# Patient Record
Sex: FEMALE | Race: WHITE | NOT HISPANIC OR LATINO | Employment: FULL TIME | URBAN - NONMETROPOLITAN AREA
[De-identification: names, ages, dates, MRNs, and addresses within clinical notes are randomized per-mention and may not be internally consistent; named-entity substitution may affect disease eponyms.]

---

## 2020-06-17 RX ORDER — OMEPRAZOLE 40 MG/1
40 CAPSULE, DELAYED RELEASE ORAL DAILY
Qty: 30 CAPSULE | Refills: 5 | Status: CANCELLED | OUTPATIENT
Start: 2020-06-17

## 2020-07-19 RX ORDER — OMEPRAZOLE 40 MG/1
40 CAPSULE, DELAYED RELEASE ORAL DAILY
Qty: 30 CAPSULE | Refills: 5 | Status: CANCELLED | OUTPATIENT
Start: 2020-07-19

## 2020-08-02 RX ORDER — BUSPIRONE HYDROCHLORIDE 15 MG/1
7.5 TABLET ORAL 2 TIMES DAILY
Qty: 30 TABLET | Refills: 3 | Status: CANCELLED | OUTPATIENT
Start: 2020-08-02

## 2020-08-02 RX ORDER — OMEPRAZOLE 40 MG/1
40 CAPSULE, DELAYED RELEASE ORAL DAILY
Qty: 30 CAPSULE | Refills: 5 | Status: CANCELLED | OUTPATIENT
Start: 2020-08-02

## 2020-12-30 ENCOUNTER — IMMUNIZATION (OUTPATIENT)
Dept: VACCINE CLINIC | Facility: HOSPITAL | Age: 30
End: 2020-12-30

## 2020-12-30 PROCEDURE — 0001A: CPT | Performed by: FAMILY MEDICINE

## 2020-12-30 PROCEDURE — 91300 HC SARSCOV02 VAC 30MCG/0.3ML IM: CPT | Performed by: FAMILY MEDICINE

## 2021-01-20 ENCOUNTER — IMMUNIZATION (OUTPATIENT)
Dept: VACCINE CLINIC | Facility: HOSPITAL | Age: 31
End: 2021-01-20

## 2021-01-20 PROCEDURE — 91300 HC SARSCOV02 VAC 30MCG/0.3ML IM: CPT | Performed by: FAMILY MEDICINE

## 2021-01-20 PROCEDURE — 0002A: CPT | Performed by: FAMILY MEDICINE

## 2021-03-18 ENCOUNTER — LAB (OUTPATIENT)
Dept: LAB | Facility: HOSPITAL | Age: 31
End: 2021-03-18

## 2021-03-18 ENCOUNTER — TRANSCRIBE ORDERS (OUTPATIENT)
Dept: ADMINISTRATIVE | Facility: HOSPITAL | Age: 31
End: 2021-03-18

## 2021-03-18 DIAGNOSIS — E55.9 VITAMIN D DEFICIENCY: ICD-10-CM

## 2021-03-18 DIAGNOSIS — D50.9 IRON DEFICIENCY ANEMIA, UNSPECIFIED IRON DEFICIENCY ANEMIA TYPE: ICD-10-CM

## 2021-03-18 DIAGNOSIS — F41.9 ANXIETY DISORDER, UNSPECIFIED TYPE: ICD-10-CM

## 2021-03-18 DIAGNOSIS — D64.9 ANEMIA, UNSPECIFIED TYPE: ICD-10-CM

## 2021-03-18 DIAGNOSIS — E78.1 PURE HYPERGLYCERIDEMIA: ICD-10-CM

## 2021-03-18 DIAGNOSIS — D50.9 IRON DEFICIENCY ANEMIA, UNSPECIFIED IRON DEFICIENCY ANEMIA TYPE: Primary | ICD-10-CM

## 2021-03-18 LAB
25(OH)D3 SERPL-MCNC: 33 NG/ML
ALBUMIN SERPL-MCNC: 4.7 G/DL (ref 3.5–5.2)
ALBUMIN/GLOB SERPL: 1.9 G/DL
ALP SERPL-CCNC: 44 U/L (ref 39–117)
ALT SERPL W P-5'-P-CCNC: 84 U/L (ref 1–33)
ANION GAP SERPL CALCULATED.3IONS-SCNC: 13.4 MMOL/L (ref 5–15)
AST SERPL-CCNC: 58 U/L (ref 1–32)
BASOPHILS # BLD AUTO: 0.05 10*3/MM3 (ref 0–0.2)
BASOPHILS NFR BLD AUTO: 0.8 % (ref 0–1.5)
BILIRUB SERPL-MCNC: 0.4 MG/DL (ref 0–1.2)
BUN SERPL-MCNC: 10 MG/DL (ref 6–20)
BUN/CREAT SERPL: 15.9 (ref 7–25)
CALCIUM SPEC-SCNC: 8.8 MG/DL (ref 8.6–10.5)
CHLORIDE SERPL-SCNC: 101 MMOL/L (ref 98–107)
CHOLEST SERPL-MCNC: 188 MG/DL (ref 0–200)
CO2 SERPL-SCNC: 25.6 MMOL/L (ref 22–29)
CREAT SERPL-MCNC: 0.63 MG/DL (ref 0.57–1)
DEPRECATED RDW RBC AUTO: 43.9 FL (ref 37–54)
EOSINOPHIL # BLD AUTO: 0.1 10*3/MM3 (ref 0–0.4)
EOSINOPHIL NFR BLD AUTO: 1.6 % (ref 0.3–6.2)
ERYTHROCYTE [DISTWIDTH] IN BLOOD BY AUTOMATED COUNT: 14.3 % (ref 12.3–15.4)
GFR SERPL CREATININE-BSD FRML MDRD: 110 ML/MIN/1.73
GLOBULIN UR ELPH-MCNC: 2.5 GM/DL
GLUCOSE SERPL-MCNC: 165 MG/DL (ref 65–99)
HBA1C MFR BLD: 7 % (ref 4.8–5.6)
HCT VFR BLD AUTO: 40 % (ref 34–46.6)
HDLC SERPL-MCNC: 34 MG/DL (ref 40–60)
HGB BLD-MCNC: 13 G/DL (ref 12–15.9)
IMM GRANULOCYTES # BLD AUTO: 0.06 10*3/MM3 (ref 0–0.05)
IMM GRANULOCYTES NFR BLD AUTO: 0.9 % (ref 0–0.5)
IRON 24H UR-MRATE: 71 MCG/DL (ref 37–145)
IRON SATN MFR SERPL: 14 % (ref 20–50)
LDLC SERPL CALC-MCNC: 98 MG/DL (ref 0–100)
LDLC/HDLC SERPL: 2.59 {RATIO}
LYMPHOCYTES # BLD AUTO: 2.01 10*3/MM3 (ref 0.7–3.1)
LYMPHOCYTES NFR BLD AUTO: 31.3 % (ref 19.6–45.3)
MCH RBC QN AUTO: 27.6 PG (ref 26.6–33)
MCHC RBC AUTO-ENTMCNC: 32.5 G/DL (ref 31.5–35.7)
MCV RBC AUTO: 84.9 FL (ref 79–97)
MONOCYTES # BLD AUTO: 0.42 10*3/MM3 (ref 0.1–0.9)
MONOCYTES NFR BLD AUTO: 6.5 % (ref 5–12)
NEUTROPHILS NFR BLD AUTO: 3.79 10*3/MM3 (ref 1.7–7)
NEUTROPHILS NFR BLD AUTO: 58.9 % (ref 42.7–76)
NRBC BLD AUTO-RTO: 0 /100 WBC (ref 0–0.2)
PLATELET # BLD AUTO: 204 10*3/MM3 (ref 140–450)
PMV BLD AUTO: 12 FL (ref 6–12)
POTASSIUM SERPL-SCNC: 3.9 MMOL/L (ref 3.5–5.2)
PROT SERPL-MCNC: 7.2 G/DL (ref 6–8.5)
RBC # BLD AUTO: 4.71 10*6/MM3 (ref 3.77–5.28)
SODIUM SERPL-SCNC: 140 MMOL/L (ref 136–145)
TIBC SERPL-MCNC: 519 MCG/DL (ref 298–536)
TRANSFERRIN SERPL-MCNC: 348 MG/DL (ref 200–360)
TRIGL SERPL-MCNC: 330 MG/DL (ref 0–150)
TSH SERPL DL<=0.05 MIU/L-ACNC: 3.92 UIU/ML (ref 0.27–4.2)
VLDLC SERPL-MCNC: 56 MG/DL (ref 5–40)
WBC # BLD AUTO: 6.43 10*3/MM3 (ref 3.4–10.8)

## 2021-03-18 PROCEDURE — 36415 COLL VENOUS BLD VENIPUNCTURE: CPT

## 2021-03-18 PROCEDURE — 83036 HEMOGLOBIN GLYCOSYLATED A1C: CPT

## 2021-03-18 PROCEDURE — 84443 ASSAY THYROID STIM HORMONE: CPT

## 2021-03-18 PROCEDURE — 83540 ASSAY OF IRON: CPT

## 2021-03-18 PROCEDURE — 84466 ASSAY OF TRANSFERRIN: CPT

## 2021-03-18 PROCEDURE — 80061 LIPID PANEL: CPT

## 2021-03-18 PROCEDURE — 80053 COMPREHEN METABOLIC PANEL: CPT

## 2021-03-18 PROCEDURE — 82306 VITAMIN D 25 HYDROXY: CPT

## 2021-03-18 PROCEDURE — 85025 COMPLETE CBC W/AUTO DIFF WBC: CPT

## 2021-06-30 ENCOUNTER — LAB (OUTPATIENT)
Dept: LAB | Facility: HOSPITAL | Age: 31
End: 2021-06-30

## 2021-06-30 ENCOUNTER — TRANSCRIBE ORDERS (OUTPATIENT)
Dept: ADMINISTRATIVE | Facility: HOSPITAL | Age: 31
End: 2021-06-30

## 2021-06-30 DIAGNOSIS — E55.9 AVITAMINOSIS D: ICD-10-CM

## 2021-06-30 DIAGNOSIS — D64.9 ANEMIA, UNSPECIFIED TYPE: ICD-10-CM

## 2021-06-30 DIAGNOSIS — R53.83 TIREDNESS: ICD-10-CM

## 2021-06-30 DIAGNOSIS — K21.9 CHALASIA OF LOWER ESOPHAGEAL SPHINCTER: Primary | ICD-10-CM

## 2021-06-30 DIAGNOSIS — R73.09 IMPAIRED GLUCOSE TOLERANCE TEST: ICD-10-CM

## 2021-06-30 DIAGNOSIS — K21.9 CHALASIA OF LOWER ESOPHAGEAL SPHINCTER: ICD-10-CM

## 2021-06-30 DIAGNOSIS — E78.1 PURE HYPERGLYCERIDEMIA: ICD-10-CM

## 2021-06-30 LAB
25(OH)D3 SERPL-MCNC: 36.3 NG/ML
ALBUMIN SERPL-MCNC: 4.5 G/DL (ref 3.5–5.2)
ALBUMIN/GLOB SERPL: 1.8 G/DL
ALP SERPL-CCNC: 45 U/L (ref 39–117)
ALT SERPL W P-5'-P-CCNC: 73 U/L (ref 1–33)
ANION GAP SERPL CALCULATED.3IONS-SCNC: 15.1 MMOL/L (ref 5–15)
AST SERPL-CCNC: 46 U/L (ref 1–32)
BILIRUB SERPL-MCNC: 0.3 MG/DL (ref 0–1.2)
BUN SERPL-MCNC: 12 MG/DL (ref 6–20)
BUN/CREAT SERPL: 19.7 (ref 7–25)
CALCIUM SPEC-SCNC: 9.2 MG/DL (ref 8.6–10.5)
CHLORIDE SERPL-SCNC: 103 MMOL/L (ref 98–107)
CHOLEST SERPL-MCNC: 172 MG/DL (ref 0–200)
CO2 SERPL-SCNC: 21.9 MMOL/L (ref 22–29)
CREAT SERPL-MCNC: 0.61 MG/DL (ref 0.57–1)
DEPRECATED RDW RBC AUTO: 42.5 FL (ref 37–54)
ERYTHROCYTE [DISTWIDTH] IN BLOOD BY AUTOMATED COUNT: 13.6 % (ref 12.3–15.4)
GFR SERPL CREATININE-BSD FRML MDRD: 113 ML/MIN/1.73
GLOBULIN UR ELPH-MCNC: 2.5 GM/DL
GLUCOSE SERPL-MCNC: 173 MG/DL (ref 65–99)
HBA1C MFR BLD: 7.6 % (ref 4.8–5.6)
HCT VFR BLD AUTO: 40.4 % (ref 34–46.6)
HDLC SERPL-MCNC: 39 MG/DL (ref 40–60)
HGB BLD-MCNC: 12.9 G/DL (ref 12–15.9)
LDLC SERPL CALC-MCNC: 93 MG/DL (ref 0–100)
LDLC/HDLC SERPL: 2.19 {RATIO}
MCH RBC QN AUTO: 27.1 PG (ref 26.6–33)
MCHC RBC AUTO-ENTMCNC: 31.9 G/DL (ref 31.5–35.7)
MCV RBC AUTO: 84.9 FL (ref 79–97)
PLATELET # BLD AUTO: 227 10*3/MM3 (ref 140–450)
PMV BLD AUTO: 12.3 FL (ref 6–12)
POTASSIUM SERPL-SCNC: 4.1 MMOL/L (ref 3.5–5.2)
PROT SERPL-MCNC: 7 G/DL (ref 6–8.5)
RBC # BLD AUTO: 4.76 10*6/MM3 (ref 3.77–5.28)
SODIUM SERPL-SCNC: 140 MMOL/L (ref 136–145)
TRIGL SERPL-MCNC: 237 MG/DL (ref 0–150)
TSH SERPL DL<=0.05 MIU/L-ACNC: 2.81 UIU/ML (ref 0.27–4.2)
VLDLC SERPL-MCNC: 40 MG/DL (ref 5–40)
WBC # BLD AUTO: 6.88 10*3/MM3 (ref 3.4–10.8)

## 2021-06-30 PROCEDURE — 82306 VITAMIN D 25 HYDROXY: CPT

## 2021-06-30 PROCEDURE — 80061 LIPID PANEL: CPT

## 2021-06-30 PROCEDURE — 80053 COMPREHEN METABOLIC PANEL: CPT

## 2021-06-30 PROCEDURE — 85027 COMPLETE CBC AUTOMATED: CPT

## 2021-06-30 PROCEDURE — 84443 ASSAY THYROID STIM HORMONE: CPT

## 2021-06-30 PROCEDURE — 36415 COLL VENOUS BLD VENIPUNCTURE: CPT

## 2021-06-30 PROCEDURE — 83036 HEMOGLOBIN GLYCOSYLATED A1C: CPT

## 2021-09-29 ENCOUNTER — TRANSCRIBE ORDERS (OUTPATIENT)
Dept: ADMINISTRATIVE | Facility: HOSPITAL | Age: 31
End: 2021-09-29

## 2021-09-29 ENCOUNTER — LAB (OUTPATIENT)
Dept: LAB | Facility: HOSPITAL | Age: 31
End: 2021-09-29

## 2021-09-29 DIAGNOSIS — E78.1 PURE HYPERGLYCERIDEMIA: ICD-10-CM

## 2021-09-29 DIAGNOSIS — R53.83 OTHER FATIGUE: ICD-10-CM

## 2021-09-29 DIAGNOSIS — D64.9 ANEMIA, UNSPECIFIED TYPE: ICD-10-CM

## 2021-09-29 DIAGNOSIS — E55.9 VITAMIN D DEFICIENCY: ICD-10-CM

## 2021-09-29 DIAGNOSIS — D64.9 ANEMIA, UNSPECIFIED TYPE: Primary | ICD-10-CM

## 2021-09-29 DIAGNOSIS — K21.9 GASTROESOPHAGEAL REFLUX DISEASE WITHOUT ESOPHAGITIS: ICD-10-CM

## 2021-09-29 LAB
25(OH)D3 SERPL-MCNC: 33.2 NG/ML
ALBUMIN SERPL-MCNC: 4.6 G/DL (ref 3.5–5.2)
ALBUMIN/GLOB SERPL: 1.9 G/DL
ALP SERPL-CCNC: 39 U/L (ref 39–117)
ALT SERPL W P-5'-P-CCNC: 43 U/L (ref 1–33)
ANION GAP SERPL CALCULATED.3IONS-SCNC: 12.4 MMOL/L (ref 5–15)
AST SERPL-CCNC: 26 U/L (ref 1–32)
BILIRUB SERPL-MCNC: 0.2 MG/DL (ref 0–1.2)
BUN SERPL-MCNC: 12 MG/DL (ref 6–20)
BUN/CREAT SERPL: 18.2 (ref 7–25)
CALCIUM SPEC-SCNC: 9 MG/DL (ref 8.6–10.5)
CHLORIDE SERPL-SCNC: 104 MMOL/L (ref 98–107)
CHOLEST SERPL-MCNC: 167 MG/DL (ref 0–200)
CO2 SERPL-SCNC: 22.6 MMOL/L (ref 22–29)
CREAT SERPL-MCNC: 0.66 MG/DL (ref 0.57–1)
DEPRECATED RDW RBC AUTO: 42.9 FL (ref 37–54)
ERYTHROCYTE [DISTWIDTH] IN BLOOD BY AUTOMATED COUNT: 13.9 % (ref 12.3–15.4)
GFR SERPL CREATININE-BSD FRML MDRD: 103 ML/MIN/1.73
GLOBULIN UR ELPH-MCNC: 2.4 GM/DL
GLUCOSE SERPL-MCNC: 162 MG/DL (ref 65–99)
HBA1C MFR BLD: 6.1 % (ref 4.8–5.6)
HCT VFR BLD AUTO: 38.3 % (ref 34–46.6)
HDLC SERPL-MCNC: 44 MG/DL (ref 40–60)
HGB BLD-MCNC: 12.6 G/DL (ref 12–15.9)
LDLC SERPL CALC-MCNC: 92 MG/DL (ref 0–100)
LDLC/HDLC SERPL: 1.98 {RATIO}
MCH RBC QN AUTO: 27.8 PG (ref 26.6–33)
MCHC RBC AUTO-ENTMCNC: 32.9 G/DL (ref 31.5–35.7)
MCV RBC AUTO: 84.4 FL (ref 79–97)
PLATELET # BLD AUTO: 204 10*3/MM3 (ref 140–450)
PMV BLD AUTO: 12.9 FL (ref 6–12)
POTASSIUM SERPL-SCNC: 4 MMOL/L (ref 3.5–5.2)
PROT SERPL-MCNC: 7 G/DL (ref 6–8.5)
RBC # BLD AUTO: 4.54 10*6/MM3 (ref 3.77–5.28)
SODIUM SERPL-SCNC: 139 MMOL/L (ref 136–145)
TRIGL SERPL-MCNC: 179 MG/DL (ref 0–150)
TSH SERPL DL<=0.05 MIU/L-ACNC: 2.64 UIU/ML (ref 0.27–4.2)
VLDLC SERPL-MCNC: 31 MG/DL (ref 5–40)
WBC # BLD AUTO: 6.18 10*3/MM3 (ref 3.4–10.8)

## 2021-09-29 PROCEDURE — 36415 COLL VENOUS BLD VENIPUNCTURE: CPT

## 2021-09-29 PROCEDURE — 85027 COMPLETE CBC AUTOMATED: CPT

## 2021-09-29 PROCEDURE — 80053 COMPREHEN METABOLIC PANEL: CPT

## 2021-09-29 PROCEDURE — 84443 ASSAY THYROID STIM HORMONE: CPT

## 2021-09-29 PROCEDURE — 80061 LIPID PANEL: CPT

## 2021-09-29 PROCEDURE — 83036 HEMOGLOBIN GLYCOSYLATED A1C: CPT

## 2021-09-29 PROCEDURE — 82306 VITAMIN D 25 HYDROXY: CPT

## 2021-10-11 ENCOUNTER — IMMUNIZATION (OUTPATIENT)
Dept: VACCINE CLINIC | Facility: HOSPITAL | Age: 31
End: 2021-10-11

## 2021-10-11 PROCEDURE — 0003A: CPT | Performed by: INTERNAL MEDICINE

## 2021-10-11 PROCEDURE — 91300 HC SARSCOV02 VAC 30MCG/0.3ML IM: CPT | Performed by: INTERNAL MEDICINE

## 2021-10-11 PROCEDURE — 0004A ADM SARSCOV2 30MCG/0.3ML BOOSTER: CPT | Performed by: INTERNAL MEDICINE

## 2022-01-25 ENCOUNTER — TRANSCRIBE ORDERS (OUTPATIENT)
Dept: ADMINISTRATIVE | Facility: HOSPITAL | Age: 32
End: 2022-01-25

## 2022-01-25 ENCOUNTER — LAB (OUTPATIENT)
Dept: LAB | Facility: HOSPITAL | Age: 32
End: 2022-01-25

## 2022-01-25 DIAGNOSIS — E11.9 DIABETES MELLITUS WITHOUT COMPLICATION: ICD-10-CM

## 2022-01-25 DIAGNOSIS — E11.9 DIABETES MELLITUS WITHOUT COMPLICATION: Primary | ICD-10-CM

## 2022-01-25 LAB
ALBUMIN SERPL-MCNC: 5 G/DL (ref 3.5–5.2)
ALBUMIN UR-MCNC: 2.1 MG/DL
ALBUMIN/GLOB SERPL: 1.9 G/DL
ALP SERPL-CCNC: 47 U/L (ref 39–117)
ALT SERPL W P-5'-P-CCNC: 64 U/L (ref 1–33)
ANION GAP SERPL CALCULATED.3IONS-SCNC: 13.3 MMOL/L (ref 5–15)
AST SERPL-CCNC: 35 U/L (ref 1–32)
BILIRUB SERPL-MCNC: 0.3 MG/DL (ref 0–1.2)
BUN SERPL-MCNC: 11 MG/DL (ref 6–20)
BUN/CREAT SERPL: 14.5 (ref 7–25)
CALCIUM SPEC-SCNC: 10.1 MG/DL (ref 8.6–10.5)
CHLORIDE SERPL-SCNC: 103 MMOL/L (ref 98–107)
CO2 SERPL-SCNC: 23.7 MMOL/L (ref 22–29)
CREAT SERPL-MCNC: 0.76 MG/DL (ref 0.57–1)
CREAT UR-MCNC: 220.4 MG/DL
FOLATE SERPL-MCNC: 7.34 NG/ML (ref 4.78–24.2)
GFR SERPL CREATININE-BSD FRML MDRD: 88 ML/MIN/1.73
GLOBULIN UR ELPH-MCNC: 2.6 GM/DL
GLUCOSE SERPL-MCNC: 121 MG/DL (ref 65–99)
HBA1C MFR BLD: 7.07 % (ref 4.8–5.6)
MICROALBUMIN/CREAT UR: 9.5 MG/G
POTASSIUM SERPL-SCNC: 4 MMOL/L (ref 3.5–5.2)
PROT SERPL-MCNC: 7.6 G/DL (ref 6–8.5)
SODIUM SERPL-SCNC: 140 MMOL/L (ref 136–145)
VIT B12 BLD-MCNC: 836 PG/ML (ref 211–946)

## 2022-01-25 PROCEDURE — 82607 VITAMIN B-12: CPT

## 2022-01-25 PROCEDURE — 82570 ASSAY OF URINE CREATININE: CPT

## 2022-01-25 PROCEDURE — 80053 COMPREHEN METABOLIC PANEL: CPT

## 2022-01-25 PROCEDURE — 82746 ASSAY OF FOLIC ACID SERUM: CPT

## 2022-01-25 PROCEDURE — 36415 COLL VENOUS BLD VENIPUNCTURE: CPT

## 2022-01-25 PROCEDURE — 83036 HEMOGLOBIN GLYCOSYLATED A1C: CPT

## 2022-01-25 PROCEDURE — 82043 UR ALBUMIN QUANTITATIVE: CPT

## 2022-10-13 ENCOUNTER — APPOINTMENT (OUTPATIENT)
Dept: GENERAL RADIOLOGY | Facility: HOSPITAL | Age: 32
End: 2022-10-13

## 2022-10-13 ENCOUNTER — HOSPITAL ENCOUNTER (EMERGENCY)
Facility: HOSPITAL | Age: 32
Discharge: HOME | End: 2022-10-13
Attending: STUDENT IN AN ORGANIZED HEALTH CARE EDUCATION/TRAINING PROGRAM | Admitting: STUDENT IN AN ORGANIZED HEALTH CARE EDUCATION/TRAINING PROGRAM

## 2022-10-13 VITALS
BODY MASS INDEX: 40.18 KG/M2 | SYSTOLIC BLOOD PRESSURE: 134 MMHG | TEMPERATURE: 98.6 F | RESPIRATION RATE: 20 BRPM | DIASTOLIC BLOOD PRESSURE: 78 MMHG | HEART RATE: 94 BPM | HEIGHT: 66 IN | WEIGHT: 250 LBS | OXYGEN SATURATION: 98 %

## 2022-10-13 DIAGNOSIS — S70.01XA CONTUSION OF RIGHT HIP, INITIAL ENCOUNTER: ICD-10-CM

## 2022-10-13 DIAGNOSIS — S20.229A CONTUSION OF THORACIC SPINE: Primary | ICD-10-CM

## 2022-10-13 LAB — B-HCG UR QL: NEGATIVE

## 2022-10-13 PROCEDURE — 72072 X-RAY EXAM THORAC SPINE 3VWS: CPT | Performed by: RADIOLOGY

## 2022-10-13 PROCEDURE — 72110 X-RAY EXAM L-2 SPINE 4/>VWS: CPT | Performed by: RADIOLOGY

## 2022-10-13 PROCEDURE — 73523 X-RAY EXAM HIPS BI 5/> VIEWS: CPT | Performed by: RADIOLOGY

## 2022-10-13 PROCEDURE — 99283 EMERGENCY DEPT VISIT LOW MDM: CPT

## 2022-10-13 PROCEDURE — 73523 X-RAY EXAM HIPS BI 5/> VIEWS: CPT

## 2022-10-13 PROCEDURE — 81025 URINE PREGNANCY TEST: CPT | Performed by: NURSE PRACTITIONER

## 2022-10-13 PROCEDURE — 72072 X-RAY EXAM THORAC SPINE 3VWS: CPT

## 2022-10-13 PROCEDURE — 72110 X-RAY EXAM L-2 SPINE 4/>VWS: CPT

## 2022-10-13 RX ORDER — HYDROCODONE BITARTRATE AND ACETAMINOPHEN 7.5; 325 MG/1; MG/1
1 TABLET ORAL ONCE
Status: COMPLETED | OUTPATIENT
Start: 2022-10-13 | End: 2022-10-13

## 2022-10-13 RX ORDER — HYDROCODONE BITARTRATE AND ACETAMINOPHEN 5; 325 MG/1; MG/1
1 TABLET ORAL EVERY 6 HOURS PRN
Qty: 10 TABLET | Refills: 0 | Status: SHIPPED | OUTPATIENT
Start: 2022-10-13

## 2022-10-13 RX ADMIN — HYDROCODONE BITARTRATE AND ACETAMINOPHEN 1 TABLET: 7.5; 325 TABLET ORAL at 10:50

## 2022-10-13 NOTE — ED NOTES
MEDICAL SCREENING:    Reason for Visit: Fall; back pain; hip pain    Patient initially seen in triage.  The patient was advised further evaluation and diagnostic testing will be needed, some of the treatment and testing will be initiated in the lobby in order to begin the process.  The patient will be returned to the waiting area for the time being and possibly be re-assessed by a subsequent ED provider.  The patient will be brought back to the treatment area in as timely manner as possible.       Marilyn Corea, APRN  10/13/22 0828

## 2022-10-13 NOTE — ED PROVIDER NOTES
Subjective     Fall  Mechanism of injury: fall    Injury location:  Torso  Torso injury location:  Back  Time since incident:  1 hour  Arrived directly from scene: no    Fall:     Fall occurred: Slipped and fell on stairs.    Impact surface:  Stairs    Point of impact:  Back    Entrapped after fall: no    Suspicion of alcohol use: no    Suspicion of drug use: no    Tetanus status:  Up to date  Prior to arrival data:     Loss of consciousness: no      Amnesic to event: no      Airway interventions:  None  Associated symptoms: back pain    Associated symptoms: no abdominal pain, no headaches, no neck pain and no seizures    Risk factors: no AICD, no asthma, no CAD, no diabetes, no past MI and not pregnant        Review of Systems   Constitutional: Negative.    HENT: Negative.    Eyes: Negative.    Respiratory: Negative.    Cardiovascular: Negative.    Gastrointestinal: Negative.  Negative for abdominal pain.   Endocrine: Negative.    Genitourinary: Negative.    Musculoskeletal: Positive for back pain. Negative for neck pain.   Skin: Negative.    Allergic/Immunologic: Negative.    Neurological: Negative.  Negative for seizures and headaches.   Hematological: Negative.    Psychiatric/Behavioral: Negative.        No past medical history on file.    Allergies   Allergen Reactions   • Cefaclor Unknown - Low Severity   • Penicillins Unknown - Low Severity   • Latex Rash       No past surgical history on file.    No family history on file.    Social History     Socioeconomic History   • Marital status:            Objective   Physical Exam  Vitals and nursing note reviewed.   Constitutional:       Appearance: She is well-developed.   HENT:      Head: Normocephalic.      Right Ear: External ear normal.      Left Ear: External ear normal.   Eyes:      Conjunctiva/sclera: Conjunctivae normal.      Pupils: Pupils are equal, round, and reactive to light.   Cardiovascular:      Rate and Rhythm: Normal rate and regular  rhythm.      Heart sounds: Normal heart sounds.   Pulmonary:      Effort: Pulmonary effort is normal.      Breath sounds: Normal breath sounds.   Abdominal:      General: Bowel sounds are normal.      Palpations: Abdomen is soft.   Musculoskeletal:         General: Normal range of motion.      Cervical back: Normal range of motion and neck supple.      Comments: Tender over thoracic area; right hip tenderness   Skin:     General: Skin is warm and dry.      Capillary Refill: Capillary refill takes less than 2 seconds.   Neurological:      Mental Status: She is alert and oriented to person, place, and time.   Psychiatric:         Behavior: Behavior normal.         Thought Content: Thought content normal.         Procedures           ED Course                                           MDM    Final diagnoses:   Contusion of thoracic spine   Contusion of right hip, initial encounter       ED Disposition  ED Disposition     ED Disposition   Discharge    Condition   Stable    Comment   --             Vane Slaughter, APRN  [address] [facility] [address] [phone]    Schedule an appointment as soon as possible for a visit   For further evaluation         Medication List      New Prescriptions    HYDROcodone-acetaminophen 5-325 MG per tablet  Commonly known as: NORCO  Take 1 tablet by mouth Every 6 (Six) Hours As Needed for Moderate Pain.           Where to Get Your Medications      These medications were sent to [facility] [address] [place] [address]    Hours: 8AM-6PM Mon-Fri Phone: [phone]   · HYDROcodone-acetaminophen 5-325 MG per tablet          Lalit Ramos, APRN  10/13/22 4897

## 2022-12-06 ENCOUNTER — TRANSCRIBE ORDERS (OUTPATIENT)
Dept: ADMINISTRATIVE | Facility: HOSPITAL | Age: 32
End: 2022-12-06

## 2022-12-06 DIAGNOSIS — R10.2 ACUTE PAIN IN FEMALE PELVIS: Primary | ICD-10-CM

## 2022-12-08 ENCOUNTER — HOSPITAL ENCOUNTER (OUTPATIENT)
Dept: ULTRASOUND IMAGING | Facility: HOSPITAL | Age: 32
Discharge: HOME | End: 2022-12-08
Admitting: NURSE PRACTITIONER

## 2022-12-08 DIAGNOSIS — R10.2 ACUTE PAIN IN FEMALE PELVIS: ICD-10-CM

## 2022-12-08 PROCEDURE — 76856 US EXAM PELVIC COMPLETE: CPT

## 2022-12-08 PROCEDURE — 76856 US EXAM PELVIC COMPLETE: CPT | Performed by: RADIOLOGY

## 2022-12-08 PROCEDURE — 76830 TRANSVAGINAL US NON-OB: CPT | Performed by: RADIOLOGY

## 2022-12-08 PROCEDURE — 76830 TRANSVAGINAL US NON-OB: CPT

## 2023-04-25 ENCOUNTER — PREP FOR SURGERY (OUTPATIENT)
Dept: OTHER | Facility: HOSPITAL | Age: 33
End: 2023-04-25
Payer: COMMERCIAL

## 2023-04-25 DIAGNOSIS — R10.2 PELVIC PAIN: ICD-10-CM

## 2023-04-25 DIAGNOSIS — N83.201 CYST OF RIGHT OVARY: ICD-10-CM

## 2023-04-25 DIAGNOSIS — D25.9 UTERINE LEIOMYOMA: ICD-10-CM

## 2023-04-25 DIAGNOSIS — N93.8 DUB (DYSFUNCTIONAL UTERINE BLEEDING): Primary | ICD-10-CM

## 2023-04-25 RX ORDER — SODIUM CHLORIDE 0.9 % (FLUSH) 0.9 %
10 SYRINGE (ML) INJECTION AS NEEDED
OUTPATIENT
Start: 2023-04-25

## 2023-04-25 RX ORDER — SODIUM CHLORIDE 0.9 % (FLUSH) 0.9 %
10 SYRINGE (ML) INJECTION EVERY 12 HOURS SCHEDULED
OUTPATIENT
Start: 2023-04-25

## 2023-04-25 RX ORDER — SODIUM CHLORIDE 9 MG/ML
40 INJECTION, SOLUTION INTRAVENOUS AS NEEDED
OUTPATIENT
Start: 2023-04-25

## 2023-04-28 NOTE — DISCHARGE INSTRUCTIONS
05-03-23  1030   ARRIVAL TIME  TAKE the following medications the morning of surgery:  All heart or blood pressure medications    HOLD all diabetic medications the morning of surgery as ordered by physician.    Please discontinue all blood thinners and anticoagulants (except aspirin) prior to surgery as per your surgeon and cardiologist instructions.  Aspirin may be continued up to the day prior to surgery.     CHLORHEXIDINE CLOTHS GIVEN WITH INSTRUCTIONS AND FORM TO RETURN TO HOSPITAL, IF APPLICABLE.    General Instructions:  Do not eat or drink after midnight: includes water, mints, or gum. You may brush your teeth.  Dental appliances that are removable must be taken out day of surgery.  Do not smoke, chew tobacco, or drink alcohol.  Bring medications in original bottles, any inhalers and if applicable your C-PAP/BI-PAP machine.  Bring any papers given to you in the doctor's office.  Wear clean comfortable clothes and socks.  Do not wear contact lenses or make-up. Bring a case for your glasses if applicable.  Bring crutches or walker if applicable.  Leave all other valuables and jewelry at home.    If you were given a blood bank ID arm band remember to bring it with you the day of surgery.    Preventing a Surgical Site Infection:  Shower the night before surgery (unless instructed other wise) using a fresh bar of anti-bacterial soap (such as Dial) and clean washcloth. Dry with a clean towel and dress in clean clothing.  For 2 to 3 days before surgery, avoid shaving with a razor near where you will have surgery because the razor can irritate skin and make it easier to develop an infection. Ask your surgeon if you will be receiving antibiotics prior to surgery.  Make sure you, your family, and all healthcare providers clear their hands with soap and water or an alcohol-based hand  before caring for you or your wound.  If at all possible, quit smoking as many days before surgery as you can.    Day of  surgery:  Upon arrival, a Pre-op nurse and Anesthesiologist will review your health history, obtain vital signs, and answer questions you may have. The only belongings needed at this time will be your home medications and if applicable your C-PAP/BI-PAP machine. If you are staying overnight your family can leave the rest of your belongings in the car and bring them to your room later. A Pre-op nurse will start an IV and you may receive medication in preparation for surgery, including something to help you relax. Your family will be able to see you in the Pre-op area. While you are in surgery your family should notify the waiting room  if they leave the waiting room area and provide a contact phone number.    Please be aware that surgery does come with discomfort. We want to make every effort to control your discomfort so please discuss any uncontrolled symptoms with your nurse. Your doctor will most likely have prescribed pain medications.    If you are going home after surgery you will receive individualized written care instructions before being discharged. A responsible adult must drive you to and from the hospital on the day of surgery and stay with you for 24 hours.    If you are staying overnight following surgery, you will be transported to your hospital room following the recovery period.  [facility] has all private rooms.    If you have any questions please call Pre-Admission Testing at [phone].  Deductibles and co-payments are collected on the day of service. Please be prepared to pay the required co-pay, deductible or deposit on the day of service as defined by your plan.    A RESPONSIBLE PERSON MUST REMAIN IN THE WAITING ROOM DURING YOUR PROCEDURE AND A RESPONSIBLE  MUST BE AVAILABLE UPON YOUR DISCHARGE.

## 2023-05-01 ENCOUNTER — PRE-ADMISSION TESTING (OUTPATIENT)
Dept: PREADMISSION TESTING | Facility: HOSPITAL | Age: 33
End: 2023-05-01
Payer: COMMERCIAL

## 2023-05-01 ENCOUNTER — LAB (OUTPATIENT)
Dept: LAB | Facility: HOSPITAL | Age: 33
End: 2023-05-01
Payer: COMMERCIAL

## 2023-05-01 DIAGNOSIS — N83.201 CYST OF RIGHT OVARY: ICD-10-CM

## 2023-05-01 DIAGNOSIS — N93.8 DUB (DYSFUNCTIONAL UTERINE BLEEDING): ICD-10-CM

## 2023-05-01 DIAGNOSIS — D25.9 UTERINE LEIOMYOMA: ICD-10-CM

## 2023-05-01 DIAGNOSIS — R10.2 PELVIC PAIN: ICD-10-CM

## 2023-05-01 LAB
ABO GROUP BLD: NORMAL
ANION GAP SERPL CALCULATED.3IONS-SCNC: 11.2 MMOL/L (ref 5–15)
BASOPHILS # BLD AUTO: 0.07 10*3/MM3 (ref 0–0.2)
BASOPHILS NFR BLD AUTO: 0.7 % (ref 0–1.5)
BLD GP AB SCN SERPL QL: NEGATIVE
BUN SERPL-MCNC: 17 MG/DL (ref 6–20)
BUN/CREAT SERPL: 22.1 (ref 7–25)
CALCIUM SPEC-SCNC: 9.3 MG/DL (ref 8.6–10.5)
CHLORIDE SERPL-SCNC: 95 MMOL/L (ref 98–107)
CO2 SERPL-SCNC: 24.8 MMOL/L (ref 22–29)
CREAT SERPL-MCNC: 0.77 MG/DL (ref 0.57–1)
DEPRECATED RDW RBC AUTO: 44.4 FL (ref 37–54)
EGFRCR SERPLBLD CKD-EPI 2021: 103.3 ML/MIN/1.73
EOSINOPHIL # BLD AUTO: 0.05 10*3/MM3 (ref 0–0.4)
EOSINOPHIL NFR BLD AUTO: 0.5 % (ref 0.3–6.2)
ERYTHROCYTE [DISTWIDTH] IN BLOOD BY AUTOMATED COUNT: 15.3 % (ref 12.3–15.4)
GLUCOSE SERPL-MCNC: 142 MG/DL (ref 65–99)
HCT VFR BLD AUTO: 38.8 % (ref 34–46.6)
HGB BLD-MCNC: 12 G/DL (ref 12–15.9)
IMM GRANULOCYTES # BLD AUTO: 0.05 10*3/MM3 (ref 0–0.05)
IMM GRANULOCYTES NFR BLD AUTO: 0.5 % (ref 0–0.5)
LYMPHOCYTES # BLD AUTO: 2.61 10*3/MM3 (ref 0.7–3.1)
LYMPHOCYTES NFR BLD AUTO: 27.7 % (ref 19.6–45.3)
MCH RBC QN AUTO: 24.9 PG (ref 26.6–33)
MCHC RBC AUTO-ENTMCNC: 30.9 G/DL (ref 31.5–35.7)
MCV RBC AUTO: 80.7 FL (ref 79–97)
MONOCYTES # BLD AUTO: 0.7 10*3/MM3 (ref 0.1–0.9)
MONOCYTES NFR BLD AUTO: 7.4 % (ref 5–12)
NEUTROPHILS NFR BLD AUTO: 5.93 10*3/MM3 (ref 1.7–7)
NEUTROPHILS NFR BLD AUTO: 63.2 % (ref 42.7–76)
NRBC BLD AUTO-RTO: 0 /100 WBC (ref 0–0.2)
PLATELET # BLD AUTO: 232 10*3/MM3 (ref 140–450)
PMV BLD AUTO: 12.1 FL (ref 6–12)
POTASSIUM SERPL-SCNC: 3.4 MMOL/L (ref 3.5–5.2)
RBC # BLD AUTO: 4.81 10*6/MM3 (ref 3.77–5.28)
RH BLD: POSITIVE
SARS-COV-2 RNA RESP QL NAA+PROBE: NOT DETECTED
SODIUM SERPL-SCNC: 131 MMOL/L (ref 136–145)
T&S EXPIRATION DATE: NORMAL
WBC NRBC COR # BLD: 9.41 10*3/MM3 (ref 3.4–10.8)

## 2023-05-01 PROCEDURE — 86900 BLOOD TYPING SEROLOGIC ABO: CPT

## 2023-05-01 PROCEDURE — 86901 BLOOD TYPING SEROLOGIC RH(D): CPT

## 2023-05-01 PROCEDURE — 36415 COLL VENOUS BLD VENIPUNCTURE: CPT

## 2023-05-01 PROCEDURE — 80048 BASIC METABOLIC PNL TOTAL CA: CPT

## 2023-05-01 PROCEDURE — 85025 COMPLETE CBC W/AUTO DIFF WBC: CPT

## 2023-05-01 PROCEDURE — 87635 SARS-COV-2 COVID-19 AMP PRB: CPT

## 2023-05-01 PROCEDURE — 86850 RBC ANTIBODY SCREEN: CPT

## 2023-05-01 RX ORDER — SIMVASTATIN 20 MG
20 TABLET ORAL NIGHTLY
COMMUNITY

## 2023-05-01 RX ORDER — ERGOCALCIFEROL 1.25 MG/1
50000 CAPSULE ORAL WEEKLY
COMMUNITY

## 2023-05-01 RX ORDER — METFORMIN HYDROCHLORIDE 500 MG/1
1000 TABLET, EXTENDED RELEASE ORAL NIGHTLY
COMMUNITY

## 2023-05-01 RX ORDER — MELOXICAM 7.5 MG/1
7.5 TABLET ORAL NIGHTLY
COMMUNITY

## 2023-05-01 RX ORDER — VENLAFAXINE HYDROCHLORIDE 150 MG/1
150 CAPSULE, EXTENDED RELEASE ORAL NIGHTLY
COMMUNITY

## 2023-05-01 RX ORDER — OMEPRAZOLE 40 MG/1
40 CAPSULE, DELAYED RELEASE ORAL NIGHTLY
COMMUNITY

## 2023-05-01 RX ORDER — BUSPIRONE HYDROCHLORIDE 10 MG/1
10 TABLET ORAL 3 TIMES DAILY PRN
COMMUNITY

## 2023-05-01 RX ORDER — FENOFIBRATE 134 MG/1
134 CAPSULE ORAL
COMMUNITY

## 2023-05-01 RX ORDER — VENLAFAXINE HYDROCHLORIDE 75 MG/1
75 CAPSULE, EXTENDED RELEASE ORAL NIGHTLY
COMMUNITY

## 2023-05-01 RX ORDER — CETIRIZINE HYDROCHLORIDE 10 MG/1
10 TABLET ORAL NIGHTLY
COMMUNITY

## 2023-05-02 NOTE — H&P
This 33 year old patient presents for Pre Op.      History of Present Illness:  1.  Pre Op   Having pelvic pain worse on right side.  Worse when close to period.  Pain has been present for 4 years.  It is worse in the last 6 months.  It does affect her quality of life pretty significantly.  She does have a cyst on the right side is hard to know if this has been there for that long or not but she likely has some endometriosis as well.    Periods are regular most of the time, but are extremely heavy.   Tried OCPs, but made her migraines worse and wasn't able to tolerate them.  Was unable to tolerate an IUD in the past.  s/p BTL.      Desire hysterectomy.  She does have a fibroid uterus and her pain affects her quality of life and so I think hysterectomy would be reasonable.  We will proceed with total laparoscopic hysterectomy with right salpingo-oophorectomy.  Risks and benefits reviewed in detail.                Screening Tools  Other Screenings  Encounter Date Performed Date Screening Tool Score Severity/ Interpretation MDD Classification Scanned Document   2023 Patient Health Questionnaire (PHQ-2) 0 Negative         Gynecologic History  Patient is premenopausal.   2023 04:30 PM  Date of last Pap: 2023.    Obstetric History  Not currently pregnant.   Past Systemic History    Medical History  (Reviewed,updated)  Disease Onset Date Comments   acid reflux     Anemia     Anxiety     Depression     Diabetes     Hypothyroidism     Ovarian Cysts     Type 2 Diabetic     Uterine Fibroid         Surgical History/Management  (Reviewed,updated)  Management Date Comments    x2     Lap Cholecystectomy 2012    Bilateral tubal ligation       Diagnostics History  Status Study Ordered Completed Interpretation  Result / Report   completed HgbA1c 2022   6.6   completed HgbA1c 2023 see detail  6.9   ordered HgbA1c 2022       completed MRI BRAIN W/O DYE  11/01/2013 11/12/2013      obtained PAP, liquid based 01/18/2023       completed TRANSVAGINAL US, NON-OB 12/01/2022 12/09/2022      ordered TRANSVAGINAL US, NON-OB 01/18/2023       result received TRANSVAGINAL US, NON-OB 03/08/2023 03/08/2023 See module     completed US ABD Complete 08/16/2019 08/26/2019   DOS 8.26.19   completed US ABD, RUQ 03/19/2014 03/25/2014      completed US EXAM, ABDOM, COMPLETE 02/02/2012 02/02/2012      completed US EXAM, PELVIC, COMPLETE 08/16/2019 08/26/2019   DOS 8.26.19   completed X-RAY EXAM ABDOMEN 1 VIEW Bilateral abdomen 05/02/2018 05/02/2018      completed X-RAY EXAM HIP UNI 2-3 VIEWS Right hip 05/02/2018 05/02/2018        Pap Result, HPV Detail and Treatment Performed  Done Pap and HPV/Treatment Pap Results HPV Details Treatment Results Comments   01/19/2023 Pap Performed See module      05/24/2018 Pap Performed           Problem List  Problem List reviewed.   Problem Description Onset Date Chronic Clinical Status Notes   Hypothyroidism 08/19/2011 Y  Mapped from [employer] Chronic Conditions table on 12/08/2014 by the ICD9 to [employer] Bulk Mapping Utility. The mapped diagnosis code was Unspecified acquired hypothyroidism, 244.9, added by Silvana Nelson, with responsible provider Kunal Enriquez MD.  Onset date 08/19/2011; last addressed on 03/05/2014.   Type 2 diabetes  Y     Hypertriglyceridemia  N     Anxiety  Y     GERD  Y     Anemia  Y     Mixed hyperlipidemia  N     Generalized anxiety disorder  N     Type 2 diabetes mellitus without complication, without long-term current use of insulin  N         Medications (active prior to today)  Medication Name Sig Description Start Date Stop Date Refilled Rx Elsewhere   Maxalt 10 mg tablet take 1 tablet by oral route once, may repeat at 2 hour intervals; do not exceed 30 mg in 24 hours 03/22/2021 03/22/2021 N   FeroSul 325 mg (65 mg iron) tablet take 1 tablet by oral route 2 times every day 09/30/2021 09/30/2021 N   Zyrtec 10 mg tablet take 1  tablet by oral route  every day 01/26/2022 01/26/2022 N   Trulicity 1.5 mg/0.5 mL subcutaneous pen injector inject (1.5MG)  by subcutaneous route  every week 03/20/2023   N   buspirone 10 mg tablet take 1 tablet by oral route 3 times every day 03/20/2023 03/20/2023 N   fenofibrate micronized 134 mg capsule take 1 capsule by oral route  every day with food 03/20/2023 03/20/2023 N   metformin  mg tablet,extended release 24 hr take 1 tablet by oral route 2 times every day with the evening meal 03/20/2023 03/20/2023 N   omeprazole 40 mg capsule,delayed release take 1 capsule by oral route  every day before a meal 03/20/2023 03/20/2023 N   venlafaxine  mg capsule,extended release 24 hr take 1 capsule by oral route  every day 03/20/2023 03/20/2023 N   venlafaxine ER 75 mg capsule,extended release 24 hr take 1 capsule by oral route  every day with food 03/20/2023 03/20/2023 N   Vitamin D2 1,250 mcg (50,000 unit) capsule take 1 capsule by oral route  every week 03/20/2023 03/20/2023 N   meloxicam 7.5 mg tablet take 1 tablet by oral route  every day 03/20/2023   N   Vascepa 1 gram capsule take 2 capsule by oral route 2 times every day with food swallowing whole. Do not chew, open, dissolve and/or crush. 04/03/2023   N     Patient Status   Completed with information received for patient transitioning into care.   Completed with information received for patient in a summary of care record.     Medication Reconciliation  Medications reconciled today.    Medication Reviewed  Adherence Medication Name Sig Desc Elsewhere Status   taking as directed Trulicity 1.5 mg/0.5 mL subcutaneous pen injector inject (1.5MG)  by subcutaneous route  every week N Verified   taking as directed buspirone 10 mg tablet take 1 tablet by oral route 3 times every day N Verified   taking as directed venlafaxine ER 75 mg capsule,extended release 24 hr take 1 capsule by oral route  every day with food N Verified   taking as directed  venlafaxine  mg capsule,extended release 24 hr take 1 capsule by oral route  every day N Verified   taking as directed Maxalt 10 mg tablet take 1 tablet by oral route once, may repeat at 2 hour intervals; do not exceed 30 mg in 24 hours N Verified   taking as directed Zyrtec 10 mg tablet take 1 tablet by oral route  every day N Verified   taking as directed omeprazole 40 mg capsule,delayed release take 1 capsule by oral route  every day before a meal N Verified   taking as directed fenofibrate micronized 134 mg capsule take 1 capsule by oral route  every day with food N Verified   taking as directed FeroSul 325 mg (65 mg iron) tablet take 1 tablet by oral route 2 times every day N Verified   taking as directed Vitamin D2 1,250 mcg (50,000 unit) capsule take 1 capsule by oral route  every week N Verified   taking as directed metformin  mg tablet,extended release 24 hr take 1 tablet by oral route 2 times every day with the evening meal N Verified   taking as directed Vascepa 1 gram capsule take 2 capsule by oral route 2 times every day with food swallowing whole. Do not chew, open, dissolve and/or crush. N Verified   taking as directed meloxicam 7.5 mg tablet take 1 tablet by oral route  every day N Verified     Allergies  Ingredient Reaction (Severity) Medication Name Comment   CEFACLOR  Ceclor    PENICILLINS            Family History    (Reviewed, updated)  Relationship Family Member Name  Age at Death Condition Onset Age Cause of Death       Family history of prostate cancer  N   Grandmas    Diabetes mellitus  N   Half sister    hypertension  N   M    Social History  (Reviewed, updated)  Tobacco use reviewed.    Preferred language is English.      Education/Employment/Occupation  Employment History Status Retired Restrictions   [employer]  full-time     [employer]       Marital Status/Family/Social Support  Marital status:      Tobacco use status: Ex-cigarette  smoker.    Smoking status: Former smoker.    Tobacco Screening  Patient has used tobacco. Patient has used tobacco in the last 30 days. Patient has not used smokeless tobacco in the last 30 days.    Smoking Status  Type Smoking Status Usage Per Day Years Used Pack Years Total Pack Years   Cigarette Former smoker 0.75 Packs 4 3 3     Tobacco Cessation Information  Date Counseled By Order Status Description Code Tobacco Cessation Information   05/24/2018 Leslie Perciado Tobacco cessation counseling completed   Smoking cessation education   03/22/2021 Lalitha Tripp Tobacco cessation counseling completed   Counseling about tobacco use (procedure)   09/30/2021 Simin Shoemaker Tobacco cessation counseling completed   Smoking cessation education   01/26/2022 Elizabeth Kulkarni Tobacco cessation counseling completed   Smoking cessation education   09/19/2022 Jennifer Michelle Tobacco cessation counseling completed   Smoking cessation education   12/01/2022 Milana Calvillo Tobacco cessation counseling completed   Smoking cessation education   01/04/2023 Milana Calvillo Tobacco cessation counseling completed   Smoking cessation education   01/18/2023 Leslie Perales Tobacco cessation counseling completed   Smoking cessation education   01/19/2023 Oksana Gilbert Tobacco cessation counseling completed   Smoking cessation education   03/20/2023 Keyona Schwarz Tobacco cessation counseling completed   Counseling about tobacco use (procedure)   05/01/2023 Yumiko Carrera Tobacco cessation counseling completed   Smoking cessation education       Vaping Use  Screened for vaping? Yes  Status: Current user  Age started: 31 years  Device type: Rechargeable  Frequency: Daily  Vaping reason: Quit smoking  Vaping cessation discussed: No    Tobacco/Vaping Exposure  There is passive vaping exposure.  There is passive smoke exposure.    Comments: no exposure    Alcohol  There is no history of alcohol use.     Caffeine  The patient uses caffeine: soda  - 12 oz a day.    Lifestyle  Diet  none.  Sikh/Spiritual  The patient has a [employer] affiliation.    Practices Restorationism.  Has spiritual beliefs.        Review of Systems  Review of Systems  System Neg/Pos Details   Constitutional Negative Chills, Fatigue, Fever, Malaise, Night sweats, Weight gain and Weight loss.   ENMT Negative Ear drainage, Hearing loss, Nasal drainage, Otalgia, Sinus pressure and Sore throat.   Eyes Negative Eye discharge, Eye pain and Vision changes.   Respiratory Negative Chronic cough, Cough, Dyspnea, Known TB exposure and Wheezing.   Cardio Negative Chest pain, Claudication, Edema and Irregular heartbeat/palpitations.   GI Negative Abdominal pain, Blood in stool, Change in stool pattern, Constipation, Decreased appetite, Diarrhea, Heartburn, Nausea and Vomiting.    Negative Dysuria, Hematuria, Polyuria (Genitourinary), Urinary frequency, Urinary incontinence and Urinary retention.   Endocrine Negative Cold intolerance, Heat intolerance, Polydipsia and Polyphagia.   Neuro Negative Dizziness, Extremity weakness, Gait disturbance, Headache, Memory impairment, Numbness in extremity, Seizures and Tremors.   Psych Negative Anxiety, Depression and Insomnia.   Integumentary Negative Brittle hair, Brittle nails, Change in shape/size of mole(s), Hair loss, Hirsutism, Hives, Pruritus, Rash and Skin lesion.   MS Negative Back pain, Joint pain, Joint swelling, Muscle weakness and Neck pain.   Hema/Lymph Negative Easy bleeding, Easy bruising and Lymphadenopathy.   Allergic/Immuno Negative Contact allergy, Environmental allergies, Food allergies and Seasonal allergies.   Reproductive Positive Irregular menses, The patient is pre-menopausal.   Reproductive Negative Breast discharge and Breast lumps.     Vital Signs     Time BP mm/Hg Pulse /min Resp /min Temp F Ht ft Ht in Ht cm Wt lb Wt kg BMI kg/m2 BSA m2 O2 Sat%    4:25 /87 116 18 98.2 5 7 170.18 242.4 109.951 37.96 2.28 97      Measured By  Time Measured by    4:25 PM Yumiko Carrera       Screening Summary  The following were reviewed: tobacco use, alcohol use, caffeine use, drugs of abuse and date of last pap        Physical Exam  Exam Findings Details   Constitutional Normal Well developed.   Neck Exam Normal Palpation - Normal.   Respiratory Normal Inspection - Normal.   Cardiovascular Normal Rhythm - Regular. Extra sounds - None. Murmurs - None.   Abdomen Normal Anterior palpation -  No rebound. No abdominal tenderness. No hepatic enlargement. No hernia.   Genitourinary * Pelvic deferred.   Skin Normal Inspection - Normal.   Psychiatric Normal Orientation - Oriented to time, place, person & situation. Appropriate mood and affect.       Completed Orders (This Visit)  Order Details Reason Side Interpretation Result Additional Info Initial Treatment Date Region   Tobacco cessation counseling      Smoking cessation education     Prescribed activity/exercise education           Dietary management education, guidance, and counseling           Patient Health Questionnaire (PHQ-2)    Negative 0      Pre-Visit Planning             Assessment/Plan  # Detail Type Description    1. Assessment DUB (dysfunctional uterine bleeding) (N93.8).    Patient Plan Pt to be at the hospital the day of procedure.  Pt educated to not eat or drink after midnight the day before surgery which includes water, mint or gum. You may brush your teeth.  Do NOT smoke, chew tobacco, or drink alcohol within 24 hours prior to surgery.  Wear clean, comfortable clothes and socks. Do Not wear contact lenses or make-up or dark nail polish.  Bring a case for your glasses if applicable.  Please keep your (red band - blood bank armband) and continue to wear until discharged after surgery.  Call if Questions Regarding Surgery.         2. Assessment Cyst of right ovary (N83.201).         3. Assessment Uterine leiomyoma, unspecified location (D25.9).         4. Assessment Dietary  counseling and surveillance (Z71.3).    Plan Orders Today's instructions / counseling include(s) Dietary management education, guidance, and counseling and Prescribed activity/exercise education .         5. Other Orders Orders not associated to today's assessments.    Plan Orders The patient had the following order(s) completed today: Patient Health Questionnaire (PHQ-2). Interpretation: Negative, Result details: 0. Today's instructions / counseling include(s) Tobacco cessation counseling.            Diagnostic History Entered Today  Performed Study Interpretation Result   05/01/2023 Pre-Visit Planning       Clinical Guidelines (Reviewed, updated)  Guidelines Status Due Action Last Addressed Comments   CMP completed 06/28/2023 Completed on 12/28/2022 12/28/2022    Depression screening completed 05/01/2024 Completed on 05/01/2023 05/01/2023    Hemoglobin A1C completed 09/20/2023 Completed on 03/20/2023 03/20/2023    Lipid panel completed 03/21/2024 Completed on 03/21/2023 03/21/2023    PAP completed 01/23/2026 Completed on 01/23/2023 01/23/2023    Pap/HPV testing completed 05/24/2023 Completed on 05/24/2018 05/24/2018    Pneumococcal 23 completed 04/21/2053 Completed on 01/26/2022 01/26/2022    PRAPARE Assessment completed 12/01/2023 Completed on 12/01/2022 12/01/2022    Pre-Visit Planning completed 05/08/2023 Completed on 05/01/2023 05/01/2023    Tdap completed  Completed on 05/29/2020 05/29/2020    Urine microalbumin completed 01/19/2024 Completed on 01/19/2023 01/19/2023      Clinical Guidelines (Declined or Excluded)  Guideline Status Due Action Last Addressed Comments   HIV Screen Declined 05/01/2023 preference         Patient Education  # Patient Education   1. Learning About Hysterectomy Surgery       Medications (Added, Continued or Stopped today)  Start Date Medication Directions PRN Status PRN Reason Instruction Stop Date   03/20/2023 buspirone 10 mg tablet take 1 tablet by oral route 3 times every day N       03/20/2023 fenofibrate micronized 134 mg capsule take 1 capsule by oral route  every day with food N      09/30/2021 FeroSul 325 mg (65 mg iron) tablet take 1 tablet by oral route 2 times every day N      03/22/2021 Maxalt 10 mg tablet take 1 tablet by oral route once, may repeat at 2 hour intervals; do not exceed 30 mg in 24 hours N      03/20/2023 meloxicam 7.5 mg tablet take 1 tablet by oral route  every day N      03/20/2023 metformin  mg tablet,extended release 24 hr take 1 tablet by oral route 2 times every day with the evening meal N      03/20/2023 omeprazole 40 mg capsule,delayed release take 1 capsule by oral route  every day before a meal N      03/20/2023 Trulicity 1.5 mg/0.5 mL subcutaneous pen injector inject (1.5MG)  by subcutaneous route  every week N      04/03/2023 Vascepa 1 gram capsule take 2 capsule by oral route 2 times every day with food swallowing whole. Do not chew, open, dissolve and/or crush. N      03/20/2023 venlafaxine  mg capsule,extended release 24 hr take 1 capsule by oral route  every day N  Take with the 75mg dose to total 225mg    03/20/2023 venlafaxine ER 75 mg capsule,extended release 24 hr take 1 capsule by oral route  every day with food N  Take with the 150mg dose to total 225mg.    03/20/2023 Vitamin D2 1,250 mcg (50,000 unit) capsule take 1 capsule by oral route  every week N      01/26/2022 Zyrtec 10 mg tablet take 1 tablet by oral route  every day N        Counseling / Educational Factors  Counseling / educational factors reviewed.

## 2023-05-03 ENCOUNTER — APPOINTMENT (OUTPATIENT)
Dept: GENERAL RADIOLOGY | Facility: HOSPITAL | Age: 33
End: 2023-05-03
Payer: COMMERCIAL

## 2023-05-03 ENCOUNTER — HOSPITAL ENCOUNTER (OUTPATIENT)
Facility: HOSPITAL | Age: 33
Setting detail: OBSERVATION
Discharge: HOME | End: 2023-05-04
Attending: OBSTETRICS & GYNECOLOGY | Admitting: OBSTETRICS & GYNECOLOGY
Payer: COMMERCIAL

## 2023-05-03 ENCOUNTER — ANESTHESIA EVENT (OUTPATIENT)
Dept: PERIOP | Facility: HOSPITAL | Age: 33
End: 2023-05-03
Payer: COMMERCIAL

## 2023-05-03 ENCOUNTER — ANESTHESIA (OUTPATIENT)
Dept: PERIOP | Facility: HOSPITAL | Age: 33
End: 2023-05-03
Payer: COMMERCIAL

## 2023-05-03 DIAGNOSIS — N83.201 OVARIAN CYST, RIGHT: ICD-10-CM

## 2023-05-03 DIAGNOSIS — N93.8 DUB (DYSFUNCTIONAL UTERINE BLEEDING): ICD-10-CM

## 2023-05-03 DIAGNOSIS — N83.201 CYST OF RIGHT OVARY: ICD-10-CM

## 2023-05-03 DIAGNOSIS — R10.2 PELVIC PAIN IN FEMALE: ICD-10-CM

## 2023-05-03 DIAGNOSIS — D25.9 LEIOMYOMA OF UTERUS, UNSPECIFIED: ICD-10-CM

## 2023-05-03 DIAGNOSIS — D25.9 UTERINE LEIOMYOMA: ICD-10-CM

## 2023-05-03 DIAGNOSIS — R10.2 PELVIC PAIN: ICD-10-CM

## 2023-05-03 LAB
ABO GROUP BLD: NORMAL
B-HCG UR QL: NEGATIVE
EXPIRATION DATE: NORMAL
GLUCOSE BLDC GLUCOMTR-MCNC: 92 MG/DL (ref 70–130)
INTERNAL NEGATIVE CONTROL: NEGATIVE
INTERNAL POSITIVE CONTROL: POSITIVE
Lab: NORMAL
RH BLD: POSITIVE

## 2023-05-03 PROCEDURE — 86901 BLOOD TYPING SEROLOGIC RH(D): CPT

## 2023-05-03 PROCEDURE — 25010000002 FENTANYL CITRATE (PF) 50 MCG/ML SOLUTION: Performed by: NURSE ANESTHETIST, CERTIFIED REGISTERED

## 2023-05-03 PROCEDURE — 81025 URINE PREGNANCY TEST: CPT | Performed by: ANESTHESIOLOGY

## 2023-05-03 PROCEDURE — 25010000002 GENTAMICIN PER 80 MG: Performed by: OBSTETRICS & GYNECOLOGY

## 2023-05-03 PROCEDURE — 86900 BLOOD TYPING SEROLOGIC ABO: CPT

## 2023-05-03 PROCEDURE — 25010000002 ONDANSETRON PER 1 MG: Performed by: NURSE ANESTHETIST, CERTIFIED REGISTERED

## 2023-05-03 PROCEDURE — 25010000002 NEOSTIGMINE 10 MG/10ML SOLUTION: Performed by: NURSE ANESTHETIST, CERTIFIED REGISTERED

## 2023-05-03 PROCEDURE — 25010000002 MIDAZOLAM PER 1 MG: Performed by: NURSE ANESTHETIST, CERTIFIED REGISTERED

## 2023-05-03 PROCEDURE — G0378 HOSPITAL OBSERVATION PER HR: HCPCS

## 2023-05-03 PROCEDURE — 25010000002 PROPOFOL 10 MG/ML EMULSION: Performed by: NURSE ANESTHETIST, CERTIFIED REGISTERED

## 2023-05-03 PROCEDURE — 82948 REAGENT STRIP/BLOOD GLUCOSE: CPT

## 2023-05-03 DEVICE — ARISTA AH ABSORBABLE HEMOSTATIC PARTICLES
Type: IMPLANTABLE DEVICE | Site: VAGINA | Status: FUNCTIONAL
Brand: ARISTA™ AH

## 2023-05-03 DEVICE — KNOTLESS TISSUE CONTROL DEVICE, UNDYED UNIDIRECTIONAL (ANTIBACTERIAL) SYNTHETIC ABSORBABLE DEVICE
Type: IMPLANTABLE DEVICE | Site: VAGINA | Status: FUNCTIONAL
Brand: STRATAFIX

## 2023-05-03 RX ORDER — NEOSTIGMINE METHYLSULFATE 1 MG/ML
INJECTION, SOLUTION INTRAVENOUS AS NEEDED
Status: DISCONTINUED | OUTPATIENT
Start: 2023-05-03 | End: 2023-05-03 | Stop reason: SURG

## 2023-05-03 RX ORDER — MIDAZOLAM HYDROCHLORIDE 1 MG/ML
1 INJECTION INTRAMUSCULAR; INTRAVENOUS
Status: DISCONTINUED | OUTPATIENT
Start: 2023-05-03 | End: 2023-05-03 | Stop reason: HOSPADM

## 2023-05-03 RX ORDER — ONDANSETRON 2 MG/ML
INJECTION INTRAMUSCULAR; INTRAVENOUS AS NEEDED
Status: DISCONTINUED | OUTPATIENT
Start: 2023-05-03 | End: 2023-05-03 | Stop reason: SURG

## 2023-05-03 RX ORDER — SODIUM CHLORIDE, SODIUM LACTATE, POTASSIUM CHLORIDE, CALCIUM CHLORIDE 600; 310; 30; 20 MG/100ML; MG/100ML; MG/100ML; MG/100ML
125 INJECTION, SOLUTION INTRAVENOUS ONCE
Status: COMPLETED | OUTPATIENT
Start: 2023-05-03 | End: 2023-05-03

## 2023-05-03 RX ORDER — CLINDAMYCIN PHOSPHATE 300 MG/50ML
300 INJECTION INTRAVENOUS
Status: COMPLETED | OUTPATIENT
Start: 2023-05-03 | End: 2023-05-03

## 2023-05-03 RX ORDER — SODIUM CHLORIDE 9 MG/ML
INJECTION, SOLUTION INTRAVENOUS AS NEEDED
Status: DISCONTINUED | OUTPATIENT
Start: 2023-05-03 | End: 2023-05-03 | Stop reason: HOSPADM

## 2023-05-03 RX ORDER — SCOLOPAMINE TRANSDERMAL SYSTEM 1 MG/1
1 PATCH, EXTENDED RELEASE TRANSDERMAL
Status: DISCONTINUED | OUTPATIENT
Start: 2023-05-03 | End: 2023-05-03

## 2023-05-03 RX ORDER — ROCURONIUM BROMIDE 10 MG/ML
INJECTION, SOLUTION INTRAVENOUS AS NEEDED
Status: DISCONTINUED | OUTPATIENT
Start: 2023-05-03 | End: 2023-05-03 | Stop reason: SURG

## 2023-05-03 RX ORDER — ONDANSETRON 4 MG/1
4 TABLET, FILM COATED ORAL EVERY 6 HOURS PRN
Status: DISCONTINUED | OUTPATIENT
Start: 2023-05-03 | End: 2023-05-04 | Stop reason: HOSPADM

## 2023-05-03 RX ORDER — SODIUM CHLORIDE 9 MG/ML
40 INJECTION, SOLUTION INTRAVENOUS AS NEEDED
Status: DISCONTINUED | OUTPATIENT
Start: 2023-05-03 | End: 2023-05-03 | Stop reason: HOSPADM

## 2023-05-03 RX ORDER — METOCLOPRAMIDE HYDROCHLORIDE 5 MG/ML
10 INJECTION INTRAMUSCULAR; INTRAVENOUS EVERY 6 HOURS PRN
Status: DISCONTINUED | OUTPATIENT
Start: 2023-05-03 | End: 2023-05-04 | Stop reason: HOSPADM

## 2023-05-03 RX ORDER — FENTANYL CITRATE 50 UG/ML
INJECTION, SOLUTION INTRAMUSCULAR; INTRAVENOUS AS NEEDED
Status: DISCONTINUED | OUTPATIENT
Start: 2023-05-03 | End: 2023-05-03 | Stop reason: SURG

## 2023-05-03 RX ORDER — GLYCOPYRROLATE 0.2 MG/ML
INJECTION INTRAMUSCULAR; INTRAVENOUS AS NEEDED
Status: DISCONTINUED | OUTPATIENT
Start: 2023-05-03 | End: 2023-05-03 | Stop reason: SURG

## 2023-05-03 RX ORDER — BUPIVACAINE HYDROCHLORIDE 2.5 MG/ML
INJECTION, SOLUTION EPIDURAL; INFILTRATION; INTRACAUDAL AS NEEDED
Status: DISCONTINUED | OUTPATIENT
Start: 2023-05-03 | End: 2023-05-03 | Stop reason: HOSPADM

## 2023-05-03 RX ORDER — OXYCODONE HYDROCHLORIDE AND ACETAMINOPHEN 5; 325 MG/1; MG/1
1 TABLET ORAL ONCE AS NEEDED
Status: DISCONTINUED | OUTPATIENT
Start: 2023-05-03 | End: 2023-05-03 | Stop reason: HOSPADM

## 2023-05-03 RX ORDER — FENTANYL CITRATE 50 UG/ML
50 INJECTION, SOLUTION INTRAMUSCULAR; INTRAVENOUS
Status: DISCONTINUED | OUTPATIENT
Start: 2023-05-03 | End: 2023-05-03 | Stop reason: HOSPADM

## 2023-05-03 RX ORDER — CLINDAMYCIN PHOSPHATE 600 MG/50ML
600 INJECTION INTRAVENOUS
Status: COMPLETED | OUTPATIENT
Start: 2023-05-03 | End: 2023-05-03

## 2023-05-03 RX ORDER — VENLAFAXINE HYDROCHLORIDE 75 MG/1
75 CAPSULE, EXTENDED RELEASE ORAL NIGHTLY
Status: DISCONTINUED | OUTPATIENT
Start: 2023-05-03 | End: 2023-05-04 | Stop reason: HOSPADM

## 2023-05-03 RX ORDER — SODIUM CHLORIDE 0.9 % (FLUSH) 0.9 %
10 SYRINGE (ML) INJECTION AS NEEDED
Status: DISCONTINUED | OUTPATIENT
Start: 2023-05-03 | End: 2023-05-03 | Stop reason: HOSPADM

## 2023-05-03 RX ORDER — IPRATROPIUM BROMIDE AND ALBUTEROL SULFATE 2.5; .5 MG/3ML; MG/3ML
3 SOLUTION RESPIRATORY (INHALATION) ONCE AS NEEDED
Status: DISCONTINUED | OUTPATIENT
Start: 2023-05-03 | End: 2023-05-03 | Stop reason: HOSPADM

## 2023-05-03 RX ORDER — PROPOFOL 10 MG/ML
VIAL (ML) INTRAVENOUS AS NEEDED
Status: DISCONTINUED | OUTPATIENT
Start: 2023-05-03 | End: 2023-05-03 | Stop reason: SURG

## 2023-05-03 RX ORDER — IBUPROFEN 800 MG/1
800 TABLET ORAL 3 TIMES DAILY
Status: DISCONTINUED | OUTPATIENT
Start: 2023-05-03 | End: 2023-05-04 | Stop reason: HOSPADM

## 2023-05-03 RX ORDER — BUSPIRONE HYDROCHLORIDE 10 MG/1
10 TABLET ORAL 3 TIMES DAILY PRN
Status: DISCONTINUED | OUTPATIENT
Start: 2023-05-03 | End: 2023-05-04 | Stop reason: HOSPADM

## 2023-05-03 RX ORDER — VENLAFAXINE HYDROCHLORIDE 150 MG/1
150 CAPSULE, EXTENDED RELEASE ORAL NIGHTLY
Status: DISCONTINUED | OUTPATIENT
Start: 2023-05-03 | End: 2023-05-04 | Stop reason: HOSPADM

## 2023-05-03 RX ORDER — OXYCODONE HYDROCHLORIDE AND ACETAMINOPHEN 5; 325 MG/1; MG/1
1 TABLET ORAL EVERY 4 HOURS PRN
Status: DISCONTINUED | OUTPATIENT
Start: 2023-05-03 | End: 2023-05-04 | Stop reason: HOSPADM

## 2023-05-03 RX ORDER — MAGNESIUM HYDROXIDE 1200 MG/15ML
LIQUID ORAL AS NEEDED
Status: DISCONTINUED | OUTPATIENT
Start: 2023-05-03 | End: 2023-05-03 | Stop reason: HOSPADM

## 2023-05-03 RX ORDER — KETOROLAC TROMETHAMINE 30 MG/ML
30 INJECTION, SOLUTION INTRAMUSCULAR; INTRAVENOUS EVERY 6 HOURS PRN
Status: DISCONTINUED | OUTPATIENT
Start: 2023-05-03 | End: 2023-05-04 | Stop reason: HOSPADM

## 2023-05-03 RX ORDER — ATORVASTATIN CALCIUM 10 MG/1
10 TABLET, FILM COATED ORAL NIGHTLY
Status: DISCONTINUED | OUTPATIENT
Start: 2023-05-03 | End: 2023-05-04 | Stop reason: HOSPADM

## 2023-05-03 RX ORDER — CETIRIZINE HYDROCHLORIDE 10 MG/1
10 TABLET ORAL NIGHTLY
Status: DISCONTINUED | OUTPATIENT
Start: 2023-05-03 | End: 2023-05-04 | Stop reason: HOSPADM

## 2023-05-03 RX ORDER — SODIUM CHLORIDE, SODIUM LACTATE, POTASSIUM CHLORIDE, CALCIUM CHLORIDE 600; 310; 30; 20 MG/100ML; MG/100ML; MG/100ML; MG/100ML
100 INJECTION, SOLUTION INTRAVENOUS ONCE AS NEEDED
Status: DISCONTINUED | OUTPATIENT
Start: 2023-05-03 | End: 2023-05-03 | Stop reason: HOSPADM

## 2023-05-03 RX ORDER — MIDAZOLAM HYDROCHLORIDE 1 MG/ML
INJECTION INTRAMUSCULAR; INTRAVENOUS AS NEEDED
Status: DISCONTINUED | OUTPATIENT
Start: 2023-05-03 | End: 2023-05-03 | Stop reason: SURG

## 2023-05-03 RX ORDER — FENOFIBRATE 145 MG/1
145 TABLET, COATED ORAL DAILY
Status: DISCONTINUED | OUTPATIENT
Start: 2023-05-03 | End: 2023-05-04 | Stop reason: HOSPADM

## 2023-05-03 RX ORDER — SODIUM CHLORIDE, SODIUM LACTATE, POTASSIUM CHLORIDE, CALCIUM CHLORIDE 600; 310; 30; 20 MG/100ML; MG/100ML; MG/100ML; MG/100ML
125 INJECTION, SOLUTION INTRAVENOUS CONTINUOUS
Status: DISCONTINUED | OUTPATIENT
Start: 2023-05-03 | End: 2023-05-04 | Stop reason: HOSPADM

## 2023-05-03 RX ORDER — ONDANSETRON 2 MG/ML
4 INJECTION INTRAMUSCULAR; INTRAVENOUS EVERY 6 HOURS PRN
Status: DISCONTINUED | OUTPATIENT
Start: 2023-05-03 | End: 2023-05-04 | Stop reason: HOSPADM

## 2023-05-03 RX ORDER — PANTOPRAZOLE SODIUM 40 MG/1
40 TABLET, DELAYED RELEASE ORAL
Status: DISCONTINUED | OUTPATIENT
Start: 2023-05-04 | End: 2023-05-04 | Stop reason: HOSPADM

## 2023-05-03 RX ORDER — FAMOTIDINE 10 MG/ML
INJECTION, SOLUTION INTRAVENOUS AS NEEDED
Status: DISCONTINUED | OUTPATIENT
Start: 2023-05-03 | End: 2023-05-03 | Stop reason: SURG

## 2023-05-03 RX ORDER — SODIUM CHLORIDE 0.9 % (FLUSH) 0.9 %
10 SYRINGE (ML) INJECTION EVERY 12 HOURS SCHEDULED
Status: DISCONTINUED | OUTPATIENT
Start: 2023-05-03 | End: 2023-05-03 | Stop reason: HOSPADM

## 2023-05-03 RX ORDER — HYDROMORPHONE HYDROCHLORIDE 1 MG/ML
0.5 INJECTION, SOLUTION INTRAMUSCULAR; INTRAVENOUS; SUBCUTANEOUS
Status: DISCONTINUED | OUTPATIENT
Start: 2023-05-03 | End: 2023-05-04 | Stop reason: HOSPADM

## 2023-05-03 RX ORDER — NALOXONE HCL 0.4 MG/ML
0.1 VIAL (ML) INJECTION
Status: DISCONTINUED | OUTPATIENT
Start: 2023-05-03 | End: 2023-05-04 | Stop reason: HOSPADM

## 2023-05-03 RX ORDER — MELOXICAM 7.5 MG/1
7.5 TABLET ORAL NIGHTLY
Status: DISCONTINUED | OUTPATIENT
Start: 2023-05-03 | End: 2023-05-03

## 2023-05-03 RX ORDER — ONDANSETRON 2 MG/ML
4 INJECTION INTRAMUSCULAR; INTRAVENOUS AS NEEDED
Status: DISCONTINUED | OUTPATIENT
Start: 2023-05-03 | End: 2023-05-03 | Stop reason: HOSPADM

## 2023-05-03 RX ORDER — KETOROLAC TROMETHAMINE 30 MG/ML
30 INJECTION, SOLUTION INTRAMUSCULAR; INTRAVENOUS EVERY 6 HOURS PRN
Status: DISCONTINUED | OUTPATIENT
Start: 2023-05-03 | End: 2023-05-03 | Stop reason: HOSPADM

## 2023-05-03 RX ORDER — SODIUM CHLORIDE, SODIUM LACTATE, POTASSIUM CHLORIDE, CALCIUM CHLORIDE 600; 310; 30; 20 MG/100ML; MG/100ML; MG/100ML; MG/100ML
INJECTION, SOLUTION INTRAVENOUS CONTINUOUS PRN
Status: DISCONTINUED | OUTPATIENT
Start: 2023-05-03 | End: 2023-05-03 | Stop reason: SURG

## 2023-05-03 RX ORDER — TRANEXAMIC ACID 100 MG/ML
INJECTION, SOLUTION INTRAVENOUS AS NEEDED
Status: DISCONTINUED | OUTPATIENT
Start: 2023-05-03 | End: 2023-05-03 | Stop reason: SURG

## 2023-05-03 RX ADMIN — FENTANYL CITRATE 100 MCG: 50 INJECTION INTRAMUSCULAR; INTRAVENOUS at 13:31

## 2023-05-03 RX ADMIN — GENTAMICIN SULFATE 178 MG: 40 INJECTION, SOLUTION INTRAMUSCULAR; INTRAVENOUS at 14:05

## 2023-05-03 RX ADMIN — SODIUM CHLORIDE, POTASSIUM CHLORIDE, SODIUM LACTATE AND CALCIUM CHLORIDE 125 ML/HR: 600; 310; 30; 20 INJECTION, SOLUTION INTRAVENOUS at 12:23

## 2023-05-03 RX ADMIN — ROCURONIUM BROMIDE 40 MG: 10 SOLUTION INTRAVENOUS at 13:34

## 2023-05-03 RX ADMIN — FENOFIBRATE 145 MG: 145 TABLET ORAL at 17:23

## 2023-05-03 RX ADMIN — MIDAZOLAM HYDROCHLORIDE 2 MG: 1 INJECTION, SOLUTION INTRAMUSCULAR; INTRAVENOUS at 13:31

## 2023-05-03 RX ADMIN — METFORMIN HYDROCHLORIDE 500 MG: 500 TABLET ORAL at 17:23

## 2023-05-03 RX ADMIN — FAMOTIDINE 20 MG: 10 INJECTION, SOLUTION INTRAVENOUS at 13:31

## 2023-05-03 RX ADMIN — SODIUM CHLORIDE, POTASSIUM CHLORIDE, SODIUM LACTATE AND CALCIUM CHLORIDE: 600; 310; 30; 20 INJECTION, SOLUTION INTRAVENOUS at 13:04

## 2023-05-03 RX ADMIN — IBUPROFEN 800 MG: 800 TABLET, FILM COATED ORAL at 21:31

## 2023-05-03 RX ADMIN — VENLAFAXINE HYDROCHLORIDE 150 MG: 150 CAPSULE, EXTENDED RELEASE ORAL at 21:31

## 2023-05-03 RX ADMIN — VENLAFAXINE HYDROCHLORIDE 75 MG: 75 CAPSULE, EXTENDED RELEASE ORAL at 21:31

## 2023-05-03 RX ADMIN — PROPOFOL 200 MG: 10 INJECTION, EMULSION INTRAVENOUS at 13:34

## 2023-05-03 RX ADMIN — IBUPROFEN 800 MG: 800 TABLET, FILM COATED ORAL at 17:23

## 2023-05-03 RX ADMIN — CETIRIZINE HYDROCHLORIDE 10 MG: 10 TABLET, FILM COATED ORAL at 21:31

## 2023-05-03 RX ADMIN — SCOPALAMINE 1 PATCH: 1 PATCH, EXTENDED RELEASE TRANSDERMAL at 12:35

## 2023-05-03 RX ADMIN — NEOSTIGMINE METHYLSULFATE 3 MG: 0.5 INJECTION INTRAVENOUS at 14:25

## 2023-05-03 RX ADMIN — FENTANYL CITRATE 50 MCG: 50 INJECTION, SOLUTION INTRAMUSCULAR; INTRAVENOUS at 14:50

## 2023-05-03 RX ADMIN — CLINDAMYCIN PHOSPHATE 600 MG: 600 INJECTION INTRAVENOUS at 13:31

## 2023-05-03 RX ADMIN — GLYCOPYRROLATE 0.4 MG: 0.4 INJECTION INTRAMUSCULAR; INTRAVENOUS at 14:25

## 2023-05-03 RX ADMIN — OFLOXACIN 50000 UNITS: 300 TABLET, COATED ORAL at 21:31

## 2023-05-03 RX ADMIN — SODIUM CHLORIDE, POTASSIUM CHLORIDE, SODIUM LACTATE AND CALCIUM CHLORIDE 125 ML/HR: 600; 310; 30; 20 INJECTION, SOLUTION INTRAVENOUS at 17:23

## 2023-05-03 RX ADMIN — FENTANYL CITRATE 50 MCG: 50 INJECTION, SOLUTION INTRAMUSCULAR; INTRAVENOUS at 14:37

## 2023-05-03 RX ADMIN — ATORVASTATIN CALCIUM 10 MG: 10 TABLET, FILM COATED ORAL at 21:31

## 2023-05-03 RX ADMIN — TRANEXAMIC ACID 1000 MG: 100 INJECTION, SOLUTION INTRAVENOUS at 14:04

## 2023-05-03 RX ADMIN — CLINDAMYCIN IN 5 PERCENT DEXTROSE 300 MG: 6 INJECTION, SOLUTION INTRAVENOUS at 13:31

## 2023-05-03 RX ADMIN — ONDANSETRON 4 MG: 2 INJECTION INTRAMUSCULAR; INTRAVENOUS at 13:31

## 2023-05-03 NOTE — ANESTHESIA POSTPROCEDURE EVALUATION
Patient: Leslie Agarwal    Procedure Summary     Date: 05/03/23 Room / Location:  [facility] 04 /  [facility]    Anesthesia Start: 1331 Anesthesia Stop: 1430    Procedure: ROBOTIC TOTAL LAPAROSCOPIC HYSTERECTOMY RIGHT SALPINGO OOPHORECTOMY (Abdomen) Diagnosis: (R10.2  D25.9   N83.201)    Surgeons: Ray Bernardo DO Provider: Aba Frost MD    Anesthesia Type: general with block ASA Status: 2          Anesthesia Type: general with block    Vitals  Vitals Value Taken Time   /69 05/03/23 1501   Temp 97 °F (36.1 °C) 05/03/23 1501   Pulse 79 05/03/23 1501   Resp 12 05/03/23 1501   SpO2 98 % 05/03/23 1501           Post Anesthesia Care and Evaluation    Patient location during evaluation: PACU  Patient participation: complete - patient participated  Level of consciousness: awake  Pain score: 3  Pain management: adequate    Airway patency: patent  Anesthetic complications: No anesthetic complications  PONV Status: controlled  Cardiovascular status: acceptable and blood pressure returned to baseline  Respiratory status: acceptable and room air  Hydration status: acceptable

## 2023-05-03 NOTE — OP NOTE
TOTAL LAPAROSCOPIC HYSTERECTOMY WITH DAVINCI ROBOT  Procedure Note    Leslie Agarwal  5/3/2023    Pre-op Diagnosis:   Pelvic pain  Adnexal cyst  Abnormal uterine bleeding    Post-op Diagnosis:     Pelvic pain  Adnexal cyst  Abnormal uterine bleeding  Mild endometriosis    Procedure(s):  Robotic total laparoscopic hysterectomy with right salpingo-oophorectomy  Fulguration of endometriosis    Surgeon(s):  Ray Bernardo DO    Anesthesia: General with Block    Estimated Blood Loss: 200ml    Specimens:                Order Name Source Comment Collection Info Order Time   ABO/RH SPECIMEN VERIFICATION PREOP   Collected By: Latasha Rivera PCT 5/3/2023 11:31 AM     Release to patient   Routine Release        TISSUE EXAM, [facility]) Uterus, Cervix, Bilateral Fallopian Tubes   Collected By: Ray Bernardo DO 5/3/2023  1:54 PM     How many specimens?   1          Release to patient   Routine Release              Procedure:  The patient was taken to the operating room after informed written consent was obtained. General anesthesia was induced and the patient was placed in the dorsal lithotomy position. The patient was sterily prepared and draped and a Wilson catheter was placed into the bladder. Next the ALMA manipulator was placed into the uterus with the KOH cup fitting snugly around the cervix. Once this was in place the surgeon was regloved and attention was made to the abdomen.     We made a 1 cm incision above the umbilicus. We inserted an OPTi view trocar into the abdomen under direct visualization using the laparoscope. Next we placed 8mm robotic trochars in the left lower quadrant and another one in the right lower quadrant we placed another trocar between the right-sided port and the midline port. All trochars used were 8mm robotic trochars. We then placed the patient in Trendelenburg and docked the robot. The remainder of the procedure was done under robotic guidance.    First  we picked up the left fallopian tube and cauterized under it using the vessel sealer extend. We took the same dissection across the broad ligament and then went across the utero-ovarian ligament and the round ligament. We did all the cauterizing using the vessel sealer extend and cutting using monopolar scissors. We then took the dissection to the point where the broad ligament divided anteriorly and posteriorly. Anteriorly we took the dissection across to make a bladder flap. We took it posteriorly as well skeletonizing the uterine artery. We then doubly cauterized and cut the uterine artery. We then took the bladder down using sharp dissection and the monopolar scissors.     We did the same procedure on the right side but we did remove the right tube and ovary.  There was a large right paratubal cyst.  There was also some endometriosis in the posterior cul-de-sac on the right side that we cauterized using monopolar cautery.    We then took serial bites on each side of the uterus taking the cardinal and the uterosacral ligaments. Once we got all the attachments of the uterus down we made a colpotomy anteriorly and carried it all away around. We then delivered the specimen into the vagina and left it there to keep the pneumoperitoneum. Next we made sure everything was hemostatic. We closed the vagina using a 2-0 monocryl V lock suture making sure to get good bites of the vaginal cuff angles and attaching them to the uterosacral ligaments bilaterally. We then ran the rest of the vaginal cuff taking 1 cm bites of vagina. We then re-approximated the peritoneum over the vaginal cuff using the same suture. We irrigated once again and made sure everything was hemostatic. We them placed some Marcaine into the pelvis for postoperative pain control. We then surveyed the remainder of the abdomen and pelvis and it was grossly normal. We removed the CO2 gas and closed the skin incision with a 4-0 monocryl and placed surgical  adhesive on the skin.    Findings: There was a large right peritubal cyst.  There was a small area of endometriosis in the right posterior cul-de-sac.    Complications: none    Grafts or Implants: NA    Ray Bernardo,      Date: 5/3/2023  Time: 14:21 EDT

## 2023-05-03 NOTE — ANESTHESIA PREPROCEDURE EVALUATION
Anesthesia Evaluation     Patient summary reviewed and Nursing notes reviewed   no history of anesthetic complications:  NPO Solid Status: > 8 hours  NPO Liquid Status: > 8 hours           Airway   Mallampati: I  TM distance: >3 FB  Neck ROM: full  No difficulty expected  Dental - normal exam     Pulmonary - normal exam    breath sounds clear to auscultation  (+) a smoker (Vape),   Cardiovascular - normal exam    Rhythm: regular  Rate: normal    (+) hyperlipidemia,   (-) past MI      Neuro/Psych  (+) psychiatric history Anxiety,    (-) CVA  GI/Hepatic/Renal/Endo    (+)  GERD,  diabetes mellitus,     Musculoskeletal (-) negative ROS    Abdominal  - normal exam   Substance History - negative use     OB/GYN negative ob/gyn ROS         Other - negative ROS                       Anesthesia Plan    ASA 2     general with block     intravenous induction     Anesthetic plan, risks, benefits, and alternatives have been provided, discussed and informed consent has been obtained with: patient.    Use of blood products discussed with patient  Consented to blood products.       CODE STATUS:

## 2023-05-03 NOTE — ANESTHESIA PROCEDURE NOTES
Airway  Urgency: elective    Date/Time: 5/3/2023 1:34 PM  End Time:5/3/2023 1:34 PM  Airway not difficult    General Information and Staff    Patient location during procedure: OR  CRNA/CAA: Arnaud King CRNA    Indications and Patient Condition  Indications for airway management: airway protection    Preoxygenated: yes  MILS maintained throughout  Mask difficulty assessment: 0 - not attempted    Final Airway Details  Final airway type: endotracheal airway      Successful airway: ETT  Cuffed: yes   Successful intubation technique: direct laryngoscopy  Endotracheal tube insertion site: oral  Blade: Estephania  Blade size: 3  ETT size (mm): 7.0  Cormack-Lehane Classification: grade IIa - partial view of glottis  Placement verified by: chest auscultation, capnometry and palpation of cuff   Cuff volume (mL): 8  Measured from: lips  ETT/EBT  to lips (cm): 22  Number of attempts at approach: 1  Assessment: lips, teeth, and gum same as pre-op and atraumatic intubation

## 2023-05-03 NOTE — ANESTHESIA PROCEDURE NOTES
"Peripheral Block      Patient reassessed immediately prior to procedure    Patient location during procedure: OR  Start time: 5/3/2023 1:37 PM  Stop time: 5/3/2023 1:43 PM  Reason for block: at surgeon's request and post-op pain management  Performed by  CRNA/CAA: Arnaud King CRNA  Preanesthetic Checklist  Completed: patient identified, IV checked, site marked, risks and benefits discussed, surgical consent, monitors and equipment checked, pre-op evaluation and timeout performed  Prep:  Pt Position: supine  Sterile barriers:cap, gloves, sterile barriers and mask  Prep: ChloraPrep  Patient monitoring: blood pressure monitoring, continuous pulse oximetry and EKG  Procedure    Nursing cardiac assessment comments yes: Sedation, GA, Spinal,Epidural   Performed under: general  Guidance:ultrasound guided    ULTRASOUND INTERPRETATION.  Using ultrasound guidance a 20 G (20g 4\" Stimuplex) gauge needle was placed in close proximity to the nerve, at which point, under ultrasound guidance anesthetic was injected in the area of the nerve and spread of the anesthesia was seen on ultrasound in close proximity thereto.  There were no abnormalities seen on ultrasound; a digital image was taken; and the patient tolerated the procedure with no complications. Images:still images obtained    Laterality:Bilateral  Block Type:TAP  Injection Technique:single-shot  Needle Type:short-bevel  Needle Gauge:20 G  Resistance on Injection: none          Medications  Preservative Free Saline:20ml  Comment:Block Injection:  Total volume divided equally between Right and Left block      Post Assessment  Injection Assessment: negative aspiration for heme, incremental injection and no paresthesia on injection  Patient Tolerance:comfortable throughout block  Complications:no  Additional Notes  The pt was in the supine position under general anesthesia    Under Ultrasound guidance, a Coulter nch 360 degree needle was advanced with Normal " Saline hydro dissection of tissue.  The Rectus and Transversus Abdominus muscles where visualized.  The needle tip was placed between the Transversus Abdominus and rectus abdominus, local anesthetic spread was visualized in the Transversus Abdominus Plane. Injection was made incrementally with aspiration every 5 mls.  There was no  intravascular injection,  injection pressure was normal, there was no neural injection, and the procedure was completed without difficulty. The same procedure was completed for left and right sided subcostal tap blocks. Thank You.

## 2023-05-03 NOTE — PLAN OF CARE
Goal Outcome Evaluation:  Plan of Care Reviewed With: patient           Outcome Evaluation: pt arrived to [facility] today from surgery, VSS on RA. Pt is resting in bed, family at bedside, no complaints. Will continue POC

## 2023-05-04 VITALS
DIASTOLIC BLOOD PRESSURE: 70 MMHG | HEIGHT: 66 IN | WEIGHT: 239 LBS | SYSTOLIC BLOOD PRESSURE: 116 MMHG | RESPIRATION RATE: 16 BRPM | HEART RATE: 81 BPM | BODY MASS INDEX: 38.41 KG/M2 | TEMPERATURE: 98.5 F | OXYGEN SATURATION: 96 %

## 2023-05-04 LAB
DEPRECATED RDW RBC AUTO: 46.9 FL (ref 37–54)
ERYTHROCYTE [DISTWIDTH] IN BLOOD BY AUTOMATED COUNT: 15.5 % (ref 12.3–15.4)
HCT VFR BLD AUTO: 33.8 % (ref 34–46.6)
HGB BLD-MCNC: 10 G/DL (ref 12–15.9)
MCH RBC QN AUTO: 24.4 PG (ref 26.6–33)
MCHC RBC AUTO-ENTMCNC: 29.6 G/DL (ref 31.5–35.7)
MCV RBC AUTO: 82.4 FL (ref 79–97)
PLATELET # BLD AUTO: 225 10*3/MM3 (ref 140–450)
PMV BLD AUTO: 12.1 FL (ref 6–12)
RBC # BLD AUTO: 4.1 10*6/MM3 (ref 3.77–5.28)
WBC NRBC COR # BLD: 15.39 10*3/MM3 (ref 3.4–10.8)

## 2023-05-04 PROCEDURE — 85027 COMPLETE CBC AUTOMATED: CPT | Performed by: OBSTETRICS & GYNECOLOGY

## 2023-05-04 PROCEDURE — G0378 HOSPITAL OBSERVATION PER HR: HCPCS

## 2023-05-04 RX ORDER — DOCUSATE CALCIUM 240 MG
240 CAPSULE ORAL DAILY
Qty: 30 CAPSULE | Refills: 1 | Status: SHIPPED | OUTPATIENT
Start: 2023-05-04

## 2023-05-04 RX ORDER — ACETAMINOPHEN 500 MG
1000 TABLET ORAL EVERY 4 HOURS PRN
Qty: 30 TABLET | Refills: 0 | Status: SHIPPED | OUTPATIENT
Start: 2023-05-04

## 2023-05-04 RX ORDER — OXYCODONE HYDROCHLORIDE 5 MG/1
5 TABLET ORAL EVERY 4 HOURS PRN
Qty: 4 TABLET | Refills: 0 | Status: SHIPPED | OUTPATIENT
Start: 2023-05-04

## 2023-05-04 RX ORDER — IBUPROFEN 600 MG/1
600 TABLET ORAL EVERY 6 HOURS PRN
Qty: 30 TABLET | Refills: 0 | Status: SHIPPED | OUTPATIENT
Start: 2023-05-04 | End: 2023-06-03

## 2023-05-04 RX ADMIN — IBUPROFEN 800 MG: 800 TABLET, FILM COATED ORAL at 05:33

## 2023-05-04 RX ADMIN — METFORMIN HYDROCHLORIDE 500 MG: 500 TABLET ORAL at 08:09

## 2023-05-04 RX ADMIN — FENOFIBRATE 145 MG: 145 TABLET ORAL at 08:09

## 2023-05-04 RX ADMIN — PANTOPRAZOLE SODIUM 40 MG: 40 TABLET, DELAYED RELEASE ORAL at 05:33

## 2023-05-04 NOTE — PLAN OF CARE
Goal Outcome Evaluation:              Outcome Evaluation: Pt resting in bed at this time. Family at bedside. Wilson cath pulled at 0330, monitoring for output. Pt ambulated in hallway this shift. Pain controlled with scheduled ibuprofen. Pt tolerating PO intake. IV fluids stopped per orders. Lap sites to abdomen CDI. Will continue POC.

## 2023-05-04 NOTE — PROGRESS NOTES
"Subjective    The patient has no complaints.  Tolerating oral intake.  Pain is controlled. +ambulation.               Objective     /70 (BP Location: Left arm, Patient Position: Lying)   Pulse 81   Temp 98.5 °F (36.9 °C) (Oral)   Resp 16   Ht 167.6 cm (66\")   Wt 108 kg (239 lb)   LMP 04/06/2023 (Exact Date)   SpO2 96%   BMI 38.58 kg/m²   General:  alert, appears stated age and cooperative   Abdomen: soft, bowel sounds active, non-tender   Incision:   healing well, no drainage, no erythema, no hernia, no seroma, no swelling, no dehiscence, incision well approximated         Assessment      {doing well:07572::\"Doing well postoperatively.\"     Plan     1. Continue any current medications.  2. Wound care discussed.  "

## 2023-05-04 NOTE — DISCHARGE SUMMARY
Date of Discharge: 05/04/23     Discharge Diagnosis:   Pelvic pain  Fibroid uterus  Right adnexal cyst  Endometriosis    Problem List:    DUB (dysfunctional uterine bleeding)      Presenting Problem/History of Present Illness  DUB (dysfunctional uterine bleeding) [N93.8]      Hospital Course  Patient is a 33 y.o. female presented with the above diagnosis and underwent elective surgery.  She did well.     Procedures Performed  Procedure(s):  ROBOTIC TOTAL LAPAROSCOPIC HYSTERECTOMY RIGHT SALPINGO OOPHORECTOMY       Consults:   Consults     No orders found for last 30 day(s).          Pertinent Test Results:    Condition on Discharge: Stable  Vital Signs  Temp:  [97 °F (36.1 °C)-98.5 °F (36.9 °C)] 98.5 °F (36.9 °C)  Heart Rate:  [] 81  Resp:  [9-20] 16  BP: (105-133)/(59-78) 116/70    Discharge Disposition  Home or Self Care    Discharge Medications     Discharge Medications      New Medications      Instructions Start Date   acetaminophen 500 MG tablet  Commonly known as: TYLENOL   1,000 mg, Oral, Every 4 Hours PRN      docusate calcium 240 MG capsule  Commonly known as: SURFAK   240 mg, Oral, Daily      ibuprofen 600 MG tablet  Commonly known as: ADVIL,MOTRIN   600 mg, Oral, Every 6 Hours PRN      oxyCODONE 5 MG immediate release tablet  Commonly known as: Roxicodone   5 mg, Oral, Every 4 Hours PRN         Continue These Medications      Instructions Start Date   busPIRone 10 MG tablet  Commonly known as: BUSPAR   10 mg, Oral, 3 Times Daily PRN      cetirizine 10 MG tablet  Commonly known as: zyrTEC   10 mg, Oral, Nightly      Dulaglutide 0.75 MG/0.5ML solution pen-injector   0.75 mg, Subcutaneous, Weekly, Prior to [facility] Admission, Patient was on: takes on Wednesdays       fenofibrate micronized 134 MG capsule  Commonly known as: LOFIBRA   134 mg, Oral, Every Morning Before Breakfast      meloxicam 7.5 MG tablet  Commonly known as: MOBIC   7.5 mg, Oral, Nightly      metFORMIN  MG 24 hr  tablet  Commonly known as: GLUCOPHAGE-XR   1,000 mg, Oral, Nightly      omeprazole 40 MG capsule  Commonly known as: priLOSEC   40 mg, Oral, Nightly      simvastatin 20 MG tablet  Commonly known as: ZOCOR   20 mg, Oral, Nightly      venlafaxine  MG 24 hr capsule  Commonly known as: EFFEXOR-XR   150 mg, Oral, Nightly, Prior to [facility] Admission, Patient was on: takes with 75 mg       venlafaxine XR 75 MG 24 hr capsule  Commonly known as: EFFEXOR-XR   75 mg, Oral, Nightly, Prior to [facility] Admission, Patient was on: takes with 150 mg       vitamin D 1.25 MG (58699 UT) capsule capsule  Commonly known as: ERGOCALCIFEROL   50,000 Units, Oral, Weekly, Prior to [facility] Admission, Patient was on: takes on Wednesdays              Discharge Diet       Activity at Discharge      Follow-up Appointments  No future appointments.        Time: Discharge 15 min

## 2023-05-05 LAB — REF LAB TEST METHOD: NORMAL

## (undated) DEVICE — 2, DISPOSABLE SUCTION/IRRIGATOR WITH DISPOSABLE TIP: Brand: STRYKEFLOW

## (undated) DEVICE — ADHS SKIN PREMIERPRO EXOFIN TOPICAL HI/VISC .5ML

## (undated) DEVICE — VESSEL SEALER EXTEND: Brand: ENDOWRIST

## (undated) DEVICE — ENDOPATH XCEL BLADELESS TROCARS WITH STABILITY SLEEVES: Brand: ENDOPATH XCEL

## (undated) DEVICE — CANNULA SEAL

## (undated) DEVICE — GLV SURG PREMIERPRO MIC LTX PF SZ8 BRN

## (undated) DEVICE — PATIENT RETURN ELECTRODE, SINGLE-USE, CONTACT QUALITY MONITORING, ADULT, WITH 9FT CORD, FOR PATIENTS WEIGING OVER 33LBS. (15KG): Brand: MEGADYNE

## (undated) DEVICE — MANIP UTER ADVINCULA DELINEATOR W/ 4CM ULTEM PLSTC CUP

## (undated) DEVICE — ANTIBACTERIAL VIOLET BRAIDED (POLYGLACTIN 910), SYNTHETIC ABSORBABLE SURGICAL SUTURE: Brand: COATED VICRYL

## (undated) DEVICE — DRAPE,UTILTY,TAPE,15X26, 4EA/PK: Brand: MEDLINE

## (undated) DEVICE — COVER,MAYO STAND,STERILE: Brand: MEDLINE

## (undated) DEVICE — SUT MNCRYL 4/0 PS2 18 IN

## (undated) DEVICE — MEGA SUTURECUT ND: Brand: ENDOWRIST

## (undated) DEVICE — BLADELESS OBTURATOR: Brand: WECK VISTA

## (undated) DEVICE — PROGRASP FORCEPS: Brand: ENDOWRIST

## (undated) DEVICE — MONOPOLAR CURVED SCISSORS: Brand: ENDOWRIST

## (undated) DEVICE — TIP COVER ACCESSORY

## (undated) DEVICE — ST TBG PNEUMOCLEAR EVAC SMOKE HIFLO

## (undated) DEVICE — PK DAVINCI 70

## (undated) DEVICE — ARM DRAPE

## (undated) DEVICE — CYSTO/BLADDER IRRIGATION SET, REGULATING CLAMP

## (undated) DEVICE — APPL CHLORAPREP HI/LITE 26ML ORNG

## (undated) DEVICE — 40595 XL TRENDELENBURG POSITIONING KIT: Brand: 40595 XL TRENDELENBURG POSITIONING KIT